# Patient Record
Sex: FEMALE | Race: OTHER
[De-identification: names, ages, dates, MRNs, and addresses within clinical notes are randomized per-mention and may not be internally consistent; named-entity substitution may affect disease eponyms.]

---

## 2017-01-18 ENCOUNTER — HOSPITAL ENCOUNTER (EMERGENCY)
Age: 42
Discharge: HOME | End: 2017-01-18
Payer: COMMERCIAL

## 2017-01-20 ENCOUNTER — HOSPITAL ENCOUNTER (OUTPATIENT)
Age: 42
Discharge: HOME | End: 2017-01-20
Payer: COMMERCIAL

## 2017-01-20 DIAGNOSIS — Q04.6: ICD-10-CM

## 2017-01-20 DIAGNOSIS — G93.5: Primary | ICD-10-CM

## 2017-01-20 PROCEDURE — 70553 MRI BRAIN STEM W/O & W/DYE: CPT

## 2017-02-19 ENCOUNTER — HOSPITAL ENCOUNTER (EMERGENCY)
Age: 42
Discharge: HOME | End: 2017-02-19
Payer: COMMERCIAL

## 2017-02-19 DIAGNOSIS — R10.9: Primary | ICD-10-CM

## 2017-02-19 DIAGNOSIS — M79.7: ICD-10-CM

## 2017-02-19 DIAGNOSIS — Z87.442: ICD-10-CM

## 2017-02-19 DIAGNOSIS — Z87.891: ICD-10-CM

## 2017-02-19 DIAGNOSIS — R11.2: ICD-10-CM

## 2017-02-19 PROCEDURE — 99284 EMERGENCY DEPT VISIT MOD MDM: CPT

## 2017-02-19 PROCEDURE — 96365 THER/PROPH/DIAG IV INF INIT: CPT

## 2017-02-19 PROCEDURE — 36415 COLL VENOUS BLD VENIPUNCTURE: CPT

## 2017-02-19 PROCEDURE — 83690 ASSAY OF LIPASE: CPT

## 2017-02-19 PROCEDURE — 96375 TX/PRO/DX INJ NEW DRUG ADDON: CPT

## 2017-02-19 PROCEDURE — 81003 URINALYSIS AUTO W/O SCOPE: CPT

## 2017-02-19 PROCEDURE — 99283 EMERGENCY DEPT VISIT LOW MDM: CPT

## 2017-02-19 PROCEDURE — 85025 COMPLETE CBC W/AUTO DIFF WBC: CPT

## 2017-02-19 PROCEDURE — 96366 THER/PROPH/DIAG IV INF ADDON: CPT

## 2017-02-19 PROCEDURE — 80053 COMPREHEN METABOLIC PANEL: CPT

## 2017-02-22 ENCOUNTER — HOSPITAL ENCOUNTER (EMERGENCY)
Age: 42
LOS: 1 days | Discharge: HOME | End: 2017-02-23
Payer: COMMERCIAL

## 2017-02-22 DIAGNOSIS — Z87.891: ICD-10-CM

## 2017-02-22 DIAGNOSIS — Z87.442: ICD-10-CM

## 2017-02-22 DIAGNOSIS — R07.9: Primary | ICD-10-CM

## 2017-02-22 DIAGNOSIS — R10.13: ICD-10-CM

## 2017-02-22 DIAGNOSIS — M79.7: ICD-10-CM

## 2017-02-23 ENCOUNTER — HOSPITAL ENCOUNTER (EMERGENCY)
Dept: HOSPITAL 21 - SED | Age: 42
Discharge: HOME | End: 2017-02-23
Payer: COMMERCIAL

## 2017-02-23 VITALS
SYSTOLIC BLOOD PRESSURE: 130 MMHG | RESPIRATION RATE: 16 BRPM | DIASTOLIC BLOOD PRESSURE: 88 MMHG | HEART RATE: 81 BPM | OXYGEN SATURATION: 100 %

## 2017-02-23 VITALS
OXYGEN SATURATION: 98 % | HEART RATE: 80 BPM | SYSTOLIC BLOOD PRESSURE: 128 MMHG | RESPIRATION RATE: 16 BRPM | DIASTOLIC BLOOD PRESSURE: 82 MMHG

## 2017-02-23 VITALS — HEIGHT: 66 IN | WEIGHT: 137.28 LBS | BODY MASS INDEX: 22.06 KG/M2

## 2017-02-23 DIAGNOSIS — K29.70: Primary | ICD-10-CM

## 2017-02-23 DIAGNOSIS — Z98.84: ICD-10-CM

## 2017-02-23 DIAGNOSIS — Z88.8: ICD-10-CM

## 2017-02-23 DIAGNOSIS — F17.200: ICD-10-CM

## 2017-02-23 DIAGNOSIS — R10.13: ICD-10-CM

## 2017-02-23 DIAGNOSIS — Z88.6: ICD-10-CM

## 2017-02-23 LAB
BASOPHILS % (AUTO): 0.1 % (ref 0–3)
EOSINOPHILS % (AUTO): 2.6 % (ref 0–5)
LIPASE SERPL-CCNC: 72 U/L (ref 13–60)
MAGNESIUM: 1.9 MG/DL (ref 1.6–2.6)
MCH RBC QN AUTO: 22.5 PG (ref 27–35)
MEAN CORPUSCULAR VOLUME: 73.3 FL (ref 81–100)
MONOCYTES % (AUTO): 7.4 % (ref 4–12)
NEUTROPHILS NFR BLD AUTO: 50.3 % (ref 40–74)
PLATELET COUNT: 370 BIL/L (ref 150–400)

## 2017-02-23 PROCEDURE — 80053 COMPREHEN METABOLIC PANEL: CPT

## 2017-02-23 PROCEDURE — 83735 ASSAY OF MAGNESIUM: CPT

## 2017-02-23 PROCEDURE — 96374 THER/PROPH/DIAG INJ IV PUSH: CPT

## 2017-02-23 PROCEDURE — 83690 ASSAY OF LIPASE: CPT

## 2017-02-23 PROCEDURE — 96361 HYDRATE IV INFUSION ADD-ON: CPT

## 2017-02-23 PROCEDURE — 74177 CT ABD & PELVIS W/CONTRAST: CPT

## 2017-02-23 PROCEDURE — 96375 TX/PRO/DX INJ NEW DRUG ADDON: CPT

## 2017-02-23 PROCEDURE — 99285 EMERGENCY DEPT VISIT HI MDM: CPT

## 2017-02-23 PROCEDURE — 96376 TX/PRO/DX INJ SAME DRUG ADON: CPT

## 2017-02-23 PROCEDURE — 36415 COLL VENOUS BLD VENIPUNCTURE: CPT

## 2017-02-23 PROCEDURE — 85025 COMPLETE CBC W/AUTO DIFF WBC: CPT

## 2017-02-23 RX ADMIN — HYDROMORPHONE HYDROCHLORIDE PRN MG: 1 INJECTION, SOLUTION INTRAMUSCULAR; INTRAVENOUS; SUBCUTANEOUS at 20:08

## 2017-02-23 RX ADMIN — HYDROMORPHONE HYDROCHLORIDE PRN MG: 1 INJECTION, SOLUTION INTRAMUSCULAR; INTRAVENOUS; SUBCUTANEOUS at 20:30

## 2017-02-23 RX ADMIN — HYDROMORPHONE HYDROCHLORIDE PRN MG: 1 INJECTION, SOLUTION INTRAMUSCULAR; INTRAVENOUS; SUBCUTANEOUS at 22:02

## 2017-02-23 NOTE — DRSVH
PROCEDURE:  CT ABDOMEN AND PELVIS WITH CONTRAST ([id number])

 

INDICATIONS:  epigastic pain, h/o gastric bypass

 

TECHNIQUE:  

After the administration of oral and intravenous contrast, 5 mm thick sections acquired from the diap
hragms to the symphysis.  5 mm thick coronal and sagittal reformats were performed.  For radiation do
se reduction, the following was used:  automated exposure control, adjustment of mA and/or kV accordi
ng to patient size.  

 

COMPARISON:  [facility], CT, ABDOMEN/PELVIS WITH CONTRAST, 4/28/2016, 17:17.

 

FINDINGS:  

Image quality:  Excellent.  

 

ABDOMEN:  

Lung bases:  Lung bases are clear.  Heart size is normal.  

 

Solid organs:  Liver and spleen are normal in size and enhancement. Scattered low attenuation foci ar
e present within the liver, suggestive of cysts. Low-attenuation splenic foci are again noted without
 change. Gallbladder has been removed.  Biliary system is non-dilated.  Pancreas enhances normally.  
No adrenal nodules.  Kidneys are normal in size and enhancement, without hydronephrosis.  

 

Peritoneum and bowel:  Small bowel, and colon loops are normal in caliber and wall thickness.  No lanette
e fluid or air.  Gastric bypass surgical changes are noted. Moderate stool is present.

 

Nodes and vessels:  No retroperitoneal or mesenteric adenopathy.  Aorta and inferior vena cava are no
rmal in caliber.  

 

Miscellaneous:  No ventral hernias.  

 

 

PELVIS:  

Genitourinary:  Bladder wall thickness is normal.  Prominent follicles/small cysts within the left ov
chuck.

 

Miscellaneous:  No inguinal hernias or adenopathy.  

 

Bones:  No suspicious bony lesions.  No vertebral body compression fractures.  

 

IMPRESSION:

 

1. Surgical changes reflecting gastric bypass. No acute abdominal or pelvic pathology.

 

2. Hepatic cysts.

 

 

 

Dictated by: Myla Celaya M.D. on 2/23/2017 at 22:06     

Approved by: Myla Celaya M.D. on 2/23/2017 at 22:10

## 2017-02-23 NOTE — ED.REPORT
HPI-Abd Pain F 40 and Over


Date of Service


2017


 ED Provider: Papa Ordonez MD


This is a 41 year old female with a history of pancreatitis s/p gastric bypass 

presenting to the emergency department complaining of epigastric abdominal pain 

that began 4 days ago. Reports radiation of epigastric pain to the back and 

vomiting. Maalox and Tums have not provided any relief. Reports decreased PO 

intake, pt states even swallowing saliva causes severe "clamping" pain. Denies 

recent NSAID use. Denies melena, hematochezia, hematemesis, dysuria, diarrhea, 

or constipation. Pt had revision gastric bypass in 2016.


Nursing Notes


Stated Complaint:  STOMACH PAIN


Chief Complaint:  Female Abdominal Pain


Nursing Notes Reviewed:  Yes


Allergies:  


Coded Allergies:  


     meloxicam (Verified  Allergy, Severe, Rash, 17)


 rash, swelling of hands


     NSAIDS (Non-Steroidal Anti-Inflamma (Verified  Allergy, Unknown, due to 

gastric bypass, 17)


     aspirin (Verified  Allergy, Unknown, hives, 17)


     sumatriptan succinate (Verified  Allergy, Unknown, 17)


 ANY TRITEN


Scheduled PRN


Ibuprofen (Ibuprofen) 200 Mg Capsule 400 MG PO QID PRN PRN For Pain 


Metoclopramide (Reglan) Unknown Strength Tablet Unknown Dose PO QID PRN PRN For 

Nausea 


Ondansetron (Zofran) 4 Mg Tablet 4 MG PO Q4 PRN PRN For Nausea 


Polyethylene Glycol 3350 (Miralax) 17 Gm Powd.pack 17 GM PO DAILY PRN PRN 

CONSTIPATION 


Promethazine (Promethazine) 25 Mg Tablet 25 MG PO Q6H PRN PRN For Nausea/

Vomiting 


   only use either the rectal or oral tablets.  Do not use more than 25mg total 

in 6 hrs. 


Promethazine HCl (Phenergan) 25 Mg Supp.rect 25 MG RC QID PRN PRN For Nausea/

Vomiting 


Zolpidem (Ambien) 5 Mg Tab 10 MG PO HS PRN PRN For Insomnia 


oxyCODONE-Acetaminophen 5-325 mg (oxyCODONE-Acetaminophen 5-325 mg) 1 Each 

Tablet 1-2 TAB PO Q6H PRN PRN For Pain 





General


Time Seen by MD:  18:52





Chief Complaint


Abdominal pain


Hx Obtained From:  Patient


Arrived By:  Walk-in


Sudden in Onset?:  Yes


Onset Occurred:  4 days ago


Symptom Duration:  Since onset


Severity: Current:  Mild


Pertinent Negative:  Pt denies other symptoms


Recent Healthcare:  No recent doctor visit, No recent hospitalization


Similar Sx Previous:  No





Past Medical History


Past Medical History Notes:  


CARLEE report is reviewed and notable for multiple narcotics and


benzodiazepines from multiple providers and multiple recent ER visits.


Past Medical History





Hernias


Pancreatitis





Past Surgical History





3 hernia repair surgeries


Gastric bypass surgery


Major intestinal surgery after intestines prolapsed into back in 2014 which was followed by multiple episodes of pancreatisis


Reports: , Cholecystectomy, Hysterectomy





Smoking History


Current Every Day Smoker





Social History


Alcohol Use:  Denies alcohol use


Drug Use:  Denies drug use


Other Social History:  





Ambulatory Status


Independent





Review of Systems


Constitutional:  Denies: Chills, Fever


GI:  Reports: Abdominal pain, Vomiting, 


   Denies: Constipation, Diarrhea, Hematemesis, Hematochezia, Nausea


 Female:  Denies: Dysuria


Complete sys rev & neg:  except as marked.





Physical Exam


Vital Signs





 Vital Signs (First)








  Date Time  Temp Pulse Resp B/P Pulse Ox O2 Delivery O2 Flow Rate FiO2


 


17 18:05 36.3 81 16 130/88 100 Room Air  








Initial VS:  Reviewed


    Head / Eyes:  Atraumatic, Normocephalic, PERRL


    ENT:  Mucous membranes moist, Conjunctiva normal, No scleral icterus


    Neck:  Supple, Non-tender, Full range of motion


    Extremities:  Vascular intact, Neuro intact, No swelling, No tenderness


    Skin:  Warm, Dry, No cyanosis


    Neurologic:  Alert, Oriented, Nonfocal


    Psychiatric:  Mood/affect normal, Behavior normal, Normal thought content


General/Constitutional:  Awake, Alert


Respiratory / Chest:  Breath sounds NL, Breath sounds = bilat, No respiratory 

distress, No rales, No rhonchi, No wheezing, No stridor


Cardiovascular:  Heart rate NL, Regular rhythm, Heart sounds NL, Peripheral 

circulation NL


Abdomen:  Soft, No guarding, No rebound, BS normoactive


Tenderness/Guarding/Rebound:  Positive: Tender epigastric


Back:  Inspection NL, Non-tender, No CVA tenderness





Interpretation & Diagnostics


Lab Results Interpretation


Result Diagram:  


17














Test


  17


18:17 17


19:00


 


White Blood Count


  7.5th/mm3


(3.8-10.1) 


 


 


Red Blood Count


  4.49mil/mm3


(3.90-5.20) 


 


 


Hemoglobin


  10.1g/dL


(12.0-15.6) 


 


 


Hematocrit


  32.9%


(35.0-46.0) 


 


 


Mean Corpuscular Volume


  73.3fL


() 


 


 


Mean Corpuscular Hemoglobin


  22.5pg


(27.0-35.0) 


 


 


Mean Corpuscular Hemoglobin


Concent 30.7%


(32.0-37.0) 


 


 


Red Cell Distribution Width


  19.9%


(12.3-15.4) 


 


 


Platelet Count


  370bil/L


(150-400) 


 


 


Neutrophils (%) (Auto) 50.3% (40-74)  


 


Lymphocytes (%) (Auto) 39.3% (14-46)  


 


Monocytes (%) (Auto) 7.4% (4-12)  


 


Eosinophils (%) (Auto) 2.6% (0-5)  


 


Basophils (%) (Auto) 0.1% (0-3)  


 


Sodium Level


  137mEq/L


(134-144) 


 


 


Potassium Level


  3.8mEq/L


(3.5-5.2) 


 


 


Chloride Level


  101mEq/L


() 


 


 


Carbon Dioxide Level


  25mmol/L


(18-29) 


 


 


Blood Urea Nitrogen 6mg/dL (6-24)  


 


Creatinine


  0.61mg/dL


(0.57-1.00) 


 


 


Estimat Glomerular Filtration


Rate 156mL/min


(>59) 


 


 


Glucose Level


  114mg/dL


(60-99) 


 


 


Calcium Level


  8.7mg/dL


(8.5-10.1) 


 


 


Magnesium Level


  1.9mg/dL


(1.6-2.6) 


 


 


Total Bilirubin


  0.4mg/dL


(0.0-1.2) 


 


 


Aspartate Amino Transf


(AST/SGOT) 20U/L (0-50) 


  


 


 


Alanine Aminotransferase


(ALT/SGPT) 12U/L (0-32) 


  


 


 


Alkaline Phosphatase 75U/L ()  


 


Total Protein


  7.0g/dL


(6.4-8.4) 


 


 


Albumin


  4.0g/dL


(3.4-5.0) 


 


 


Lipase 72U/L (13-60)  


 


Hold Grey Top Tube


  Received


(Received) 


 


 


Hold Urine


  


  Received


(Received)











CT Abd / Pelvis Interpretation


Study type:  Abdom CT oral contrast


Interpretation / Wet Read by:  Discussed w radiologist


NL CT Abdomen Findings:  No acute disease





Re-Eval/Medical Decision


Med Decision/Clinical Course


This is a 41 year old female with a history of pancreatitis s/p gastric bypass 

presenting to the emergency department complaining of epigastric abdominal pain 

that began 4 days ago. Reports radiation of epigastric pain to the back and 

vomiting. Maalox and Tums have not provided any relief. Reports decreased PO 

intake, pt states even swallowing saliva causes severe "clamping" pain. Denies 

recent NSAID use. Denies melena, hematochezia, hematemesis, dysuria, diarrhea, 

or constipation. Pt had revision gastric bypass in 2016.





Here in the emergency department the patient is afebrile stool examination as 

above.  Of note she is tender about the epigastric region.





Laboratory studies notable as below:


no leucocytosis


Hct 32.9 which is stable from baseline 


chemistry unremarkable


lipase mildly elevated at 72 though decreased from baseline





Patient was treated with the below medications:


IV pantoprazole 


IV fluids


8 mg Zofran 


hydromorphone PRN 





Patient reported significant symptomatic improvement.  Due to concern for 

possible anastomotic leak competition related to her gastric bypass surgery I 

obtained a CT scan of the abdomen and pelvis that was essentially unremarkable.

  Serial abdominal examinations remained benign.  Patient has been started on 

omeprazole and referred to gastroenterology for consideration of upper 

endoscopy to assess for any evidence of peptic ulcer disease.  At this time I 

feel she is appropriate for discharge home.  Pulse pressure precautions were 

given detail and she was discharged in good condition.


Counseled Regarding:  Diagnosis, Lab results, Need for follow-up, When/why to 

return to ED





Discharge & Departure





 Primary Impression:  


 Gastritis


 Additional Impressions:  


 History of gastric bypass


 Epigastric pain


Disposition:  Home


Discharge Condition


All VS Reviewed:  Yes


Condition:  Stable


Patient Instructions:  Gastritis (ED)





Additional Instructions:


Thank you for seeking care at emergency room.





It is difficult for us to make definitive diagnoses in the ED but we believe 

that you are experiencing gastritis. 





Our primary goal today in the ED was to evaluate you for any life-threatening 

conditions. Your evaluation was reassuring.





You will be discharged with a prescription for omeprazole, take as prescribed. 





You should follow-up with the GI doctor listed below. 





You should return to the ED immediately if you develop fevers, vomiting, cough, 

shortness of breath, chest pain, lightheadedness, weakness or any other 

concerning signs or symptoms.





Thank you for letting us partake in your care today.


Referrals:  


OTHER,PHYSICIAN (PCP)











Javier Negron MD


Scribe Attestation


Portions of this note were transcribed by Guanaco Lira. I, Dr. Ordonez 

personally performed the history, physical exam and medical decision-making; I 

reviewed and confirmed the accuracy of the information in the transcribed note. 





Signed by: santosh Canseco. 2017, 23:30.








Paap Ordonez MD 2017 18:55


GUANACO LIRA 2017 19:00

## 2017-05-06 ENCOUNTER — HOSPITAL ENCOUNTER (EMERGENCY)
Dept: HOSPITAL 76 - ED | Age: 42
Discharge: LEFT BEFORE DISCHARGE | End: 2017-05-06
Payer: COMMERCIAL

## 2017-05-06 VITALS — DIASTOLIC BLOOD PRESSURE: 89 MMHG | SYSTOLIC BLOOD PRESSURE: 125 MMHG

## 2017-05-06 DIAGNOSIS — Z53.21: Primary | ICD-10-CM

## 2017-05-21 ENCOUNTER — HOSPITAL ENCOUNTER (EMERGENCY)
Dept: HOSPITAL 21 - SED | Age: 42
Discharge: HOME | End: 2017-05-21
Payer: COMMERCIAL

## 2017-05-21 VITALS
DIASTOLIC BLOOD PRESSURE: 86 MMHG | HEART RATE: 93 BPM | SYSTOLIC BLOOD PRESSURE: 132 MMHG | RESPIRATION RATE: 20 BRPM | OXYGEN SATURATION: 100 %

## 2017-05-21 VITALS
SYSTOLIC BLOOD PRESSURE: 132 MMHG | DIASTOLIC BLOOD PRESSURE: 86 MMHG | RESPIRATION RATE: 20 BRPM | OXYGEN SATURATION: 100 % | HEART RATE: 93 BPM

## 2017-05-21 VITALS — HEIGHT: 66 IN | WEIGHT: 142.31 LBS | BODY MASS INDEX: 22.87 KG/M2

## 2017-05-21 DIAGNOSIS — Z98.890: ICD-10-CM

## 2017-05-21 DIAGNOSIS — N20.1: Primary | ICD-10-CM

## 2017-05-21 DIAGNOSIS — F17.200: ICD-10-CM

## 2017-05-21 DIAGNOSIS — Z88.6: ICD-10-CM

## 2017-05-21 DIAGNOSIS — Z88.9: ICD-10-CM

## 2017-05-21 DIAGNOSIS — R11.0: ICD-10-CM

## 2017-05-21 DIAGNOSIS — R10.9: ICD-10-CM

## 2017-05-21 LAB
APPEARANCE UR: CLEAR
BASOPHILS % (AUTO): 0.4 % (ref 0–3)
COLOR,URINE: (no result)
EOSINOPHILS % (AUTO): 2.2 % (ref 0–5)
LIPASE SERPL-CCNC: 106 U/L (ref 13–60)
MAGNESIUM: 2 MG/DL (ref 1.6–2.6)
MCH RBC QN AUTO: 21.8 PG (ref 27–35)
MEAN CORPUSCULAR VOLUME: 71.9 FL (ref 81–100)
MONOCYTES % (AUTO): 6.9 % (ref 4–12)
NEUTROPHILS NFR BLD AUTO: 52.3 % (ref 40–74)
PH,URINE: 5.5 (ref 5–8)
PLATELET COUNT: 405 BIL/L (ref 150–400)
RBC UR QL: (no result)
SPECIFIC GRAVITY,URINE: 1 (ref 1–1.03)
UROBILINOGEN UR-MCNC: NORMAL MG/DL
YEAST,URINE: (no result)

## 2017-05-21 PROCEDURE — 96374 THER/PROPH/DIAG INJ IV PUSH: CPT

## 2017-05-21 PROCEDURE — 81000 URINALYSIS NONAUTO W/SCOPE: CPT

## 2017-05-21 PROCEDURE — 83735 ASSAY OF MAGNESIUM: CPT

## 2017-05-21 PROCEDURE — 74176 CT ABD & PELVIS W/O CONTRAST: CPT

## 2017-05-21 PROCEDURE — 80053 COMPREHEN METABOLIC PANEL: CPT

## 2017-05-21 PROCEDURE — 83690 ASSAY OF LIPASE: CPT

## 2017-05-21 PROCEDURE — 85025 COMPLETE CBC W/AUTO DIFF WBC: CPT

## 2017-05-21 PROCEDURE — 96361 HYDRATE IV INFUSION ADD-ON: CPT

## 2017-05-21 PROCEDURE — 96375 TX/PRO/DX INJ NEW DRUG ADDON: CPT

## 2017-05-21 PROCEDURE — 36415 COLL VENOUS BLD VENIPUNCTURE: CPT

## 2017-05-21 PROCEDURE — 99285 EMERGENCY DEPT VISIT HI MDM: CPT

## 2017-05-21 NOTE — DRSVH
PROCEDURE:  CT KUB ([id number])

 

INDICATIONS:  Right flank h/o kidney stones

 

TECHNIQUE:  

Noncontrast 5 mm thick sections acquired from the diaphragms to the symphysis.  5 mm thick coronal an
d sagittal reformats were then performed.  For radiation dose reduction, the following was used:  aut
omated exposure control, adjustment of mA and/or kV according to patient size.  

 

COMPARISON:  [facility], , ABDOMEN 2 VIEW, 3/24/2017, 23:05.  [facility], CT, CT A
BD PELVIS W CON, 2/23/2017, 21:37.  [facility], CR, ABDOMEN ACUTE SERIES, 6/25/2016, 21:49.  [facility], CT, ABDOMEN/PELVIS WITH CONTRAST, 4/28/2016, 17:17.  [facility], , ABDOMEN 2 VIE
W, 4/27/2016, 8:54.  [facility], ABDOMEN 2 VIEW, 2/27/2016, 16:16.  [facility], US, ABD
OMEN LIMITED, 2/24/2016, 21:07.  [facility], , ABDOMEN 2 VIEW, 2/24/2016, 20:34.  [facility], CR, ABDOMEN ACUTE SERIES, 9/29/2015, 18:08.  [facility], CT, CT ABD PELVIS W CON, 8/
30/2015, 17:20.  [facility], CR, ABDOMEN ACUTE SERIES, 7/21/2015, 0:09.  [facility],
 CR, ABD ACUTE SERIES, 6/13/2015, 15:36.  [facility], CT, ABDOMEN/PELVIS WITH CONTRAST, 4/20/201
5, 8:32.  [facility], CR, ABDOMEN ACUTE SERIES, 2/16/2015, 23:02.  [facility] CR, ABDOMEN 
ACUTE SERIES, 2/02/2015, 19:27.  [facility], CT, ABDOMEN/PELVIS WITH CONTRAST, 9/07/2014, 19:16.
  [facility], CT, ABDOMEN/PELVIS WITHOUT CONTRAS, 9/04/2014, 9:10.  [facility], CT, ABDOMEN
/PELVIS WITH CONTRAST, 8/08/2014, 0:53.  [facility], CT, ABDOMEN/PELVIS WITH CONTRAST, 5/23/2013
, 20:47.  [facility], CT, KIDNEY/ URETER/BLADDER, 10/25/2012, 17:48.

 

FINDINGS:  

Image quality:  Excellent.  

 

Lung bases:  Lung bases are clear.  Heart size is normal.  

 

Urinary system:  Both kidneys are normal in size.  No kidney stones.  No hydronephrosis or perinephri
c fat stranding.  Both ureters appear non-dilated throughout their expected courses.  Bladder wall th
ickness is normal; no calcified bladder stones. Small phleboliths in the lower pelvis are stable comp
ared to prior examinations.  

 

Other solid organs:  Liver and spleen are normal in size. Attic cysts are stable compared to prior ex
aminations.  Gallbladder surgically absent.  Pancreas is normal in contours.  No adrenal nodules.  

 

Peritoneum and bowel:  Postsurgical changes compatible prior gastric bypass surgery noted.  Unenhance
d bowel loops demonstrate normal wall thickness and caliber.  No free fluid or air. Visualized append
ix is normal.  

 

Nodes and vessels:  No retroperitoneal or mesenteric adenopathy by size criteria.  Aorta and inferior
 vena cava are normal in caliber.  

 

Abdominal wall:  No ventral hernias.  

 

Pelvis:  No free pelvic fluid.  No inguinal hernias or adenopathy. Right adnexal region cyst are stab
le compared to prior CT scan obtained 2/23/2017.  

 

Bones:  No suspicious bony lesions.  No vertebral body compression fractures.  

 

 

IMPRESSION:

 

1.  No renal stone or hydronephrosis.

 

2.  Status post gastric bypass, cholecystectomy and hysterectomy.

 

3.  2.6 cm and 2.0 cm left adnexal region cyst.  Recommend nonemergent pelvic ultrasound for definiti
ve characterization.

 

 

 

Dictated by: Tamara Gerber MD, PhD on 5/21/2017 at 11:03     

Approved by: Tamara Gerber MD, PhD on 5/21/2017 at 11:09

## 2017-05-21 NOTE — ED.REPORT
HPI-Abd Pain F 40 and Over


Date of Service


May 21, 2017


 ED Provider: Rolando Abraham MD


The patient is a 41 year old female who presents to the ED with severe lower 

back pain onset last night. C/o associated nausea. There was no apparent fall 

or trauma that could have led to symptoms. She denies UTI symptoms and fever.  

When she stands up she has groin pain. Sig hx of hysterectomy, cholecystectomy, 

gastric bypass surgery, intestinal surgery, and pancreatitis. Pt requests 

narcotics.


Nursing Notes


Stated Complaint:  KIDNEY PAIN


Chief Complaint:  Female Abdominal Pain


Nursing Notes Reviewed:  Yes


Allergies:  


Coded Allergies:  


     meloxicam (Verified  Allergy, Severe, Rash, 17)


 rash, swelling of hands


     NSAIDS (Non-Steroidal Anti-Inflamma (Verified  Allergy, Unknown, due to 

gastric bypass, 17)


     aspirin (Verified  Allergy, Unknown, hives, 17)


     sumatriptan succinate (Verified  Allergy, Unknown, 17)


 ANY TRITEN


Scheduled


Omeprazole (Omeprazole) 20 Mg Capsule.dr 20 MG PO BID 





Scheduled PRN


Hydrocodone-Acetaminophen 5-325 mg (Hydrocodone-Acetaminophen 5-325 mg) 1 Each 

Tablet 1 TABLET PO Q4H PRN PRN For Pain 


Hydrocodone-Acetaminophen 5-325 mg (Hydrocodone-Acetaminophen 5-325 mg) 1 Each 

Tablet 1 TABLET PO Q4H PRN PRN For Pain 


Ibuprofen (Ibuprofen) 200 Mg Capsule 400 MG PO QID PRN PRN For Pain 


Metoclopramide (Reglan) Unknown Strength Tablet Unknown Dose PO QID PRN PRN For 

Nausea 


Ondansetron (Zofran) 4 Mg Tablet 4 MG PO Q4 PRN PRN For Nausea 


Ondansetron ODT (Zofran ODT) 4 Mg Tablet 4 MG PO Q4H PRN PRN For Nausea 


Polyethylene Glycol 3350 (Miralax) 17 Gm Powd.pack 17 GM PO DAILY PRN PRN 

CONSTIPATION 


Promethazine (Promethazine) 25 Mg Tablet 25 MG PO Q6H PRN PRN For Nausea/

Vomiting 


   only use either the rectal or oral tablets.  Do not use more than 25mg total 

in 6 hrs. 


Promethazine HCl (Phenergan) 25 Mg Supp.rect 25 MG RC QID PRN PRN For Nausea/

Vomiting 


Zolpidem (Ambien) 5 Mg Tab 10 MG PO HS PRN PRN For Insomnia 


oxyCODONE-Acetaminophen 5-325 mg (oxyCODONE-Acetaminophen 5-325 mg) 1 Each 

Tablet 1-2 TAB PO Q6H PRN PRN For Pain 





General


Time Seen by MD:  10:14





Chief Complaint


Other (lower back pain)


Hx Obtained From:  Patient


Arrived By:  Walk-in


Sudden in Onset?:  Yes


Onset Occurred:  Yesterday


Symptom Duration:  Since onset


Location:  : Back


Quality:  Painful


Severity: Current:  Moderate


Recent Healthcare:  No recent doctor visit, No recent hospitalization


Similar Sx Previous:  No





Past Medical History


Past Medical History Notes:  


CARLEE report is reviewed and notable for multiple narcotics and


benzodiazepines from multiple providers and multiple recent ER visits.


Past Medical History





Hernias


Pancreatitis





Past Surgical History





3 hernia repair surgeries


Gastric bypass surgery


Major intestinal surgery after intestines prolapsed into back in 2014 which was followed by multiple episodes of pancreatitis


Reports: , Cholecystectomy, Hysterectomy, Inguinal hernia repair





Smoking History


Current Every Day Smoker





Social History


Alcohol Use:  Denies alcohol use


Drug Use:  Denies drug use


Other Social History:  





Ambulatory Status


Independent





Review of Systems





epigastric pain


Constitutional:  Denies: Fever


GI:  Reports: Nausea


 Female:  Denies: Dysuria, Hematuria, Urinary frequency, Urinary urgency


Musculoskeletal:  Reports: Back pain


Complete sys rev & neg:  except as marked.





Physical Exam


Vital Signs





 Vital Signs (First)








  Date Time  Temp Pulse Resp B/P Pulse Ox O2 Delivery O2 Flow Rate FiO2


 


17 10:03 36.1 93 20 132/86 100 Room Air  








Initial VS:  Reviewed


    Head / Eyes:  Atraumatic, Normocephalic


    ENT:  Mucous membranes moist


    Lymphatic:  No lymphadenopathy


    Extremities:  Vascular intact, Neuro intact, No swelling, No tenderness


    Skin:  Warm, Dry


Abdomen:  Atraumatic, Soft, Non-tender


Back:  No muscle spasm





right cva tenderness





Interpretation & Diagnostics


Interpretation & Diagnostics:  


CT KUB IMPRESSION:


1.  No renal stone or hydronephrosis.


2.  Status post gastric bypass, cholecystectomy and hysterectomy.


3.  2.6 cm and 2.0 cm left adnexal region cyst.  Recommend nonemergent


pelvic ultrasound for definitive characterization.


Dictated by: Tamara Gerber MD, PhD on 2017 at 11:03     


Approved by: Tamara Gerber MD, PhD on 2017 at 11:09


Lab Results Interpretation


Result Diagram:  


17 1025                                                                   

             17 1025














Test


  17


10:20 17


10:25


 


Urine Color Straw (YELLOW)  


 


Urine Appearance


  Clear


(CLEAR,HAZY) 


 


 


Urine pH 5.5 (5.0-8.0)  


 


Urine Specific Gravity


  1.005


(1.003-1.035) 


 


 


Urine Protein


  Negativemg/dL


(NEG,TRACE) 


 


 


Urine Glucose (UA)


  Negativemg/dL


(NEGATIVE) 


 


 


Urine Ketones


  Negativemg/dL


(NEGATIVE) 


 


 


Urine Occult Blood


  Trace


(NEGATIVE) 


 


 


Urine Nitrite


  Negative


(NEGATIVE) 


 


 


Urine Bilirubin


  Negative


(NEGATIVE) 


 


 


Urine Urobilinogen


  Normalmg/dL


(NORMAL) 


 


 


Urine Leukocyte Esterase


  Negative


(NEGATIVE) 


 


 


Urine RBC 0-2/hpf (0-2)  


 


Urine WBC 0-5/hpf (0-5)  


 


Urine Epithelial Cells


  Few/hpf


(NONE-MOD) 


 


 


Urine Crystals


  None seen


(NONE SEEN) 


 


 


Urine Bacteria


  Few/hpf


(NONE-FEW) 


 


 


Urine Hyaline Casts


  None/lpf


(NONE) 


 


 


Urine Granular Casts


  None seen


(NONE SEEN) 


 


 


Urine Waxy Casts


  None seen


(NONE SEEN) 


 


 


Urine Red Blood Cell Casts


  None seen


(NONE SEEN) 


 


 


Urine White Blood Cell Casts


  None seen


(NONE SEEN) 


 


 


Urine Mucus


  None seen


(None Seen) 


 


 


Urine Trichomonas


  None seen


(NONE SEEN) 


 


 


Urine Yeast


  None (NONE


SEEN) 


 


 


Urinalysis Comment None  


 


Urine Culture Reflexed Not indicated  


 


Hold Urine


  Received


(Received) 


 


 


White Blood Count


  


  5.4th/mm3


(3.8-10.1)


 


Red Blood Count


  


  4.41mil/mm3


(3.90-5.20)


 


Hemoglobin


  


  9.6g/dL


(12.0-15.6)


 


Hematocrit


  


  31.7%


(35.0-46.0)


 


Mean Corpuscular Volume


  


  71.9fL


()


 


Mean Corpuscular Hemoglobin


  


  21.8pg


(27.0-35.0)


 


Mean Corpuscular Hemoglobin


Concent 


  30.3%


(32.0-37.0)


 


Red Cell Distribution Width


  


  20.2%


(12.3-15.4)


 


Platelet Count


  


  405bil/L


(150-400)


 


Neutrophils (%) (Auto)  52.3% (40-74) 


 


Lymphocytes (%) (Auto)  38.2% (14-46) 


 


Monocytes (%) (Auto)  6.9% (4-12) 


 


Eosinophils (%) (Auto)  2.2% (0-5) 


 


Basophils (%) (Auto)  0.4% (0-3) 


 


Sodium Level


  


  139mEq/L


(134-144)


 


Potassium Level


  


  4.3mEq/L


(3.5-5.2)


 


Chloride Level


  


  105mEq/L


()


 


Carbon Dioxide Level


  


  23mmol/L


(18-29)


 


Blood Urea Nitrogen  9mg/dL (6-24) 


 


Creatinine


  


  0.46mg/dL


(0.57-1.00)


 


Estimat Glomerular Filtration


Rate 


  216mL/min


(>59)


 


Glucose Level


  


  87mg/dL


(60-99)


 


Calcium Level


  


  8.7mg/dL


(8.5-10.1)


 


Magnesium Level


  


  2.0mg/dL


(1.6-2.6)


 


Total Bilirubin


  


  0.5mg/dL


(0.0-1.2)


 


Aspartate Amino Transf


(AST/SGOT) 


  29U/L (0-50) 


 


 


Alanine Aminotransferase


(ALT/SGPT) 


  13U/L (0-32) 


 


 


Alkaline Phosphatase  72U/L () 


 


Total Protein


  


  6.4g/dL


(6.4-8.4)


 


Albumin


  


  3.5g/dL


(3.4-5.0)


 


Lipase  106U/L (13-60) 











Re-Eval/Medical Decision


Med Decision/Clinical Course





41-year-old female history of hysterectomy, cholecystectomy, appendectomy,


kidney stone presenting with right flank pain since last night.  Hematuria


on urine no evidence of infection.  Right CVA tenderness on initial exam.


Her CT KUB shows no evidence of kidney stones.  Her pain resolved.  Labs


are stable.  Likely with kidney stone passed.  Discharged home with return


precautions.


Re-Evaluation/Progress :  


   Time of Eval:  11:45


   Patient Status:  Condition improved, Pain improved


   Re-Evaluation/Progress Note:  


Pt rechecked. Her pain is much improved. Informed pt of normal catscan


results, plan for discharge and treatment. F/U and RTER warnings given. Pt


understands and agrees with plan. All questions addressed.


Counseled Regarding:  Diagnosis, Lab results, Need for follow-up, When/why to 

return to ED





Discharge & Departure





 Primary Impression:  


 Kidney stone


 Additional Impression:  


 Abdominal pain


 Abdominal location:  unspecified location  Qualified Code:  R10.9 - 

Unspecified abdominal pain


Disposition:  Home


Discharge Condition


All VS Reviewed:  Yes


Condition:  Stable





Additional Instructions:


Thank you for entrusting us with your care today. Your Catscan was normal  but 

suggests that you could have passed a kidney stone. All of your labs are normal 

as well and there are no signs of infection.  Return to the Emergency 

Department for any new or worsening symptoms including nausea, fever, vomiting, 

abdominal pain, and diarrhea.  I hope you feel better soon, enjoy the beautiful 

weather!


Referrals:  


OTHER,PHYSICIAN (PCP)











[facility]


Scribe Attestation


Portion of this note were transcribed by Shae Watson. I, Dr. Abraham, 

personally performed the history, physical exam, and medical decision-making: I 

reviewed and confirmed the accuracy for the information in the transcribed note.


 


Signed by: santosh Carter, 17 1200


copies to:   [facility]








Rolando Abraham MD May 21, 2017 10:23


Shae Watson May 21, 2017 10:44

## 2017-06-03 ENCOUNTER — HOSPITAL ENCOUNTER (EMERGENCY)
Dept: HOSPITAL 76 - ED | Age: 42
Discharge: HOME | End: 2017-06-03
Payer: COMMERCIAL

## 2017-06-03 VITALS — SYSTOLIC BLOOD PRESSURE: 118 MMHG | DIASTOLIC BLOOD PRESSURE: 69 MMHG

## 2017-06-03 DIAGNOSIS — Z87.442: ICD-10-CM

## 2017-06-03 DIAGNOSIS — Z87.891: ICD-10-CM

## 2017-06-03 DIAGNOSIS — R10.12: Primary | ICD-10-CM

## 2017-06-03 DIAGNOSIS — M79.7: ICD-10-CM

## 2017-06-03 DIAGNOSIS — K44.9: ICD-10-CM

## 2017-06-03 DIAGNOSIS — Z98.84: ICD-10-CM

## 2017-06-03 LAB
ALBUMIN/GLOB SERPL: 1.2 {RATIO} (ref 1–2.2)
ANION GAP SERPL CALCULATED.4IONS-SCNC: 8 MMOL/L (ref 6–13)
BILIRUB BLD-MCNC: 0.4 MG/DL (ref 0.2–1)
BUN SERPL-MCNC: 8 MG/DL (ref 6–20)
CALCIUM UR-MCNC: 8.5 MG/DL (ref 8.5–10.3)
CHLORIDE SERPL-SCNC: 103 MMOL/L (ref 101–111)
CO2 SERPL-SCNC: 26 MMOL/L (ref 21–32)
CREAT SERPLBLD-SCNC: 0.5 MG/DL (ref 0.4–1)
GFRSERPLBLD MDRD-ARVRAT: 137 ML/MIN/{1.73_M2} (ref 89–?)
GLOBULIN SER-MCNC: 3 G/DL (ref 2.1–4.2)
GLUCOSE SERPL-MCNC: 93 MG/DL (ref 70–100)
LIPASE SERPL-CCNC: 51 U/L (ref 22–51)
POTASSIUM SERPL-SCNC: 4.1 MMOL/L (ref 3.5–5)
PROT SPEC-MCNC: 6.7 G/DL (ref 6.7–8.2)
SODIUM SERPLBLD-SCNC: 137 MMOL/L (ref 135–145)

## 2017-06-03 PROCEDURE — 80053 COMPREHEN METABOLIC PANEL: CPT

## 2017-06-03 PROCEDURE — 99284 EMERGENCY DEPT VISIT MOD MDM: CPT

## 2017-06-03 PROCEDURE — 36415 COLL VENOUS BLD VENIPUNCTURE: CPT

## 2017-06-03 PROCEDURE — 83690 ASSAY OF LIPASE: CPT

## 2017-06-03 PROCEDURE — 99283 EMERGENCY DEPT VISIT LOW MDM: CPT

## 2017-07-04 ENCOUNTER — HOSPITAL ENCOUNTER (EMERGENCY)
Dept: HOSPITAL 21 - SED | Age: 42
Discharge: HOME | End: 2017-07-04
Payer: COMMERCIAL

## 2017-07-04 VITALS — HEIGHT: 66 IN | WEIGHT: 137.13 LBS | BODY MASS INDEX: 22.04 KG/M2

## 2017-07-04 VITALS
OXYGEN SATURATION: 100 % | SYSTOLIC BLOOD PRESSURE: 131 MMHG | RESPIRATION RATE: 17 BRPM | DIASTOLIC BLOOD PRESSURE: 78 MMHG

## 2017-07-04 DIAGNOSIS — Z98.818: ICD-10-CM

## 2017-07-04 DIAGNOSIS — Z88.6: ICD-10-CM

## 2017-07-04 DIAGNOSIS — F17.200: ICD-10-CM

## 2017-07-04 DIAGNOSIS — K08.89: Primary | ICD-10-CM

## 2017-07-04 DIAGNOSIS — M27.3: ICD-10-CM

## 2017-07-04 DIAGNOSIS — Z88.8: ICD-10-CM

## 2017-07-04 DIAGNOSIS — Z98.84: ICD-10-CM

## 2017-07-04 NOTE — ED.REPORT
HPI-Dental/Mouth Prob


Date of Service


2017


 ED Provider:  Dr. Ordonez


Pt is a 40 y/o female presenting to the ED s/p total extraction 6 days ago 

complaining of increased pain. She reports that the pain is increased today and 

that she can taste a discharge from the wound site. She denies any other 

symptoms at this time.  She denies fevers chills, nausea vomiting.


Nursing Notes


Stated Complaint:  LEFT LOWER JAW/EAR PAIN


Chief Complaint:  ENT & Mouth


Nursing Notes Reviewed:  Yes


Allergies:  


Coded Allergies:  


     meloxicam (Verified  Allergy, Severe, Rash, 17)


 rash, swelling of hands


     NSAIDS (Non-Steroidal Anti-Inflamma (Verified  Allergy, Unknown, due to 

gastric bypass, 17)


     aspirin (Verified  Allergy, Unknown, hives, 17)


     sumatriptan succinate (Verified  Allergy, Unknown, 17)


 ANY TRITEN


Scheduled


Omeprazole (Omeprazole) 20 Mg Capsule.dr 20 MG PO BID 





Scheduled PRN


Hydrocodone-Acetaminophen 5-325 mg (Hydrocodone-Acetaminophen 5-325 mg) 1 Each 

Tablet 1 TABLET PO Q4H PRN PRN For Pain 


Hydrocodone-Acetaminophen 5-325 mg (Hydrocodone-Acetaminophen 5-325 mg) 1 Each 

Tablet 1 TABLET PO Q4H PRN PRN For Pain 


Ibuprofen (Ibuprofen) 200 Mg Capsule 400 MG PO QID PRN PRN For Pain 


Metoclopramide (Reglan) Unknown Strength Tablet Unknown Dose PO QID PRN PRN For 

Nausea 


Ondansetron (Zofran) 4 Mg Tablet 4 MG PO Q4 PRN PRN For Nausea 


Ondansetron ODT (Zofran ODT) 4 Mg Tablet 4 MG PO Q4H PRN PRN For Nausea 


Polyethylene Glycol 3350 (Miralax) 17 Gm Powd.pack 17 GM PO DAILY PRN PRN 

CONSTIPATION 


Promethazine (Promethazine) 25 Mg Tablet 25 MG PO Q6H PRN PRN For Nausea/

Vomiting 


   only use either the rectal or oral tablets.  Do not use more than 25mg total 

in 6 hrs. 


Promethazine HCl (Phenergan) 25 Mg Supp.rect 25 MG RC QID PRN PRN For Nausea/

Vomiting 


Zolpidem (Ambien) 5 Mg Tab 10 MG PO HS PRN PRN For Insomnia 


oxyCODONE-Acetaminophen 5-325 mg (oxyCODONE-Acetaminophen 5-325 mg) 1 Each 

Tablet 1-2 TAB PO Q6H PRN PRN For Pain 


oxyCODONE-Acetaminophen 5-325 mg (oxyCODONE-Acetaminophen 5-325 mg) 1 Each 

Tablet 1 TAB PO Q4H PRN PRN For Pain 





General


Time Seen by MD:  17:05





Chief Complaint


Mouth pain


Hx Obtained From:  Patient


Arrived By:  Walk-in


Onset Occurred:  Just prior to arrival


Symptom Duration:  Since onset


Quality:  Painful


Severity: Current:  Moderate


Severity: Maximum:  Severe


Recent Healthcare:  No recent doctor visit, No recent hospitalization


Similar Sx Previous:  No





Past Medical History


Past Medical History Notes:  


CARLEE report is reviewed and notable for multiple narcotics and


benzodiazepines from multiple providers and multiple recent ER visits.


Past Medical History





Hernias


Pancreatitis





Past Surgical History





3 hernia repair surgeries


Gastric bypass surgery


Major intestinal surgery after intestines prolapsed into back in 2014 which was followed by multiple episodes of pancreatitis


Reports: , Cholecystectomy, Hysterectomy, Inguinal hernia repair





Smoking History


Current Every Day Smoker





Social History


Alcohol Use:  Denies alcohol use


Drug Use:  Denies drug use


Other Social History:  





Ambulatory Status


Independent





Review of Systems


Constitutional:  Denies: Fever, Weakness - generalized


Ears / Nose / Throat:  Reports: Mouth pain


GI:  Denies: Abdominal pain, Vomiting


Complete sys rev & neg:  except as marked.





Physical Exam


Initial Vital Signs





 Vital Signs (First)








  Date Time  Temp Pulse Resp B/P Pulse Ox O2 Delivery O2 Flow Rate FiO2


 


17 17:00 36.8 103 17 131/78 100 Room Air  








Initial VS:  Reviewed


General/Constitutional:  Well-developed, Well-nourished


    Head / Eyes:  Atraumatic, Normocephalic, PERRL


    Respiratory:  Breath sounds normal, Clear to auscultation, No respiratory 

distress


    Abdomen / GI:  Soft, Non-tender, No guarding, No rebound, No distention


    Extremities:  Vascular intact, Neuro intact, No swelling, No tenderness


    Skin:  Warm, Dry, No cyanosis


    Neurologic:  Alert, Oriented, Nonfocal


    Psychiatric:  Mood/affect normal, Behavior normal, Normal thought content


ENT:  Atraumatic, Airway patent, Mucous membranes moist





Tooth 17 absent. No bleeding or purulent discharge. No sign of inefection.


Neck:  Atraumatic, Supple, Full range of motion, No adenopathy





Re-Eval/Medical Decision


Med Decision/Clinical Course


Pt is a 40 y/o female presenting to the ED s/p total extraction 6 days ago 

complaining of increased pain. She reports that the pain is increased today and 

that she can taste a discharge from the wound site. She denies any other 

symptoms at this time.  Here in the emergency department the patient was 

afebrile with stable vital signs examination as above.  Overall presentation is 

most consistent with dry socket.  The socket was packed.  The patient was 

provided with pain medications.  She is artery on antibiotics which she is 

advised to continue taking. Considered other possible causes of dental pain 

including periapical abscess, gingivitis, Steve's angina, sinusitis, and molar 

impaction but these are unlikely based on the history and exam.  Patient will 

follow up with her dentist tomorrow. Also reviewed precautions for return to 

the ED and the importance of f/u with a dentist, which she agreed to do.


Re-Evaluation/Progress :  


   Time of Eval:  17:17


   Patient Status:  Condition improved


   Re-Evaluation/Progress Note:  


Discussed plan for discharge. Pt understands and agrees.


Counseled Regarding:  Diagnosis, Lab results, Need for follow-up, When/why to 

return to ED





Discharge & Departure





 Primary Impression:  


 Pain, dental


 Additional Impression:  


 Dry tooth socket


Disposition:  Home


Discharge Condition


All VS Reviewed:  Yes


Condition:  Improved





Additional Instructions:


Thank you for seeking care at the emergency room.


Our primary goal today in the ED was to evaluate you for any life-threatening 

conditions. Your evaluation was reassuring.


You will be discharged with a prescription for Oxycodone.


Keep taking the Amoxicillin. Follow up with your dentist as soon as they return 

to the office. Return to the ER or call a different dentist office if you 

develop any new or worsening symptoms. 


You should return to the ED immediately if you develop fevers, vomiting, cough, 

shortness of breath, chest pain, lightheadedness, weakness or any other 

concerning signs or symptoms.


Thank you for letting us partake in your care today.


Referrals:  


KARLENE BAILEY (PCP)


Scribe Attestation


Portions of this note were transcribed by Marie Mcelroy. I, Dr. Ordonez 

personally performed the history, physical exam and medical decision-making; I 

reviewed and confirmed the accuracy of the information in the transcribed note. 

Signed by: Arya Cates, 2017 at 1724.


copies to:   [facility] Papa Gaytan MD 2017 17:06


MARIE MCELROY 2017 17:17

## 2017-08-05 ENCOUNTER — HOSPITAL ENCOUNTER (EMERGENCY)
Dept: HOSPITAL 76 - ED | Age: 42
Discharge: HOME | End: 2017-08-05
Payer: COMMERCIAL

## 2017-08-05 VITALS — SYSTOLIC BLOOD PRESSURE: 123 MMHG | DIASTOLIC BLOOD PRESSURE: 82 MMHG

## 2017-08-05 DIAGNOSIS — R03.0: ICD-10-CM

## 2017-08-05 DIAGNOSIS — S30.0XXA: Primary | ICD-10-CM

## 2017-08-05 DIAGNOSIS — Z87.891: ICD-10-CM

## 2017-08-05 DIAGNOSIS — M79.7: ICD-10-CM
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
## 2017-08-05 PROCEDURE — 99283 EMERGENCY DEPT VISIT LOW MDM: CPT

## 2017-08-05 PROCEDURE — 72100 X-RAY EXAM L-S SPINE 2/3 VWS: CPT

## 2017-08-05 PROCEDURE — 72220 X-RAY EXAM SACRUM TAILBONE: CPT

## 2017-08-05 PROCEDURE — 99282 EMERGENCY DEPT VISIT SF MDM: CPT

## 2017-08-07 ENCOUNTER — HOSPITAL ENCOUNTER (EMERGENCY)
Dept: HOSPITAL 21 - SED | Age: 42
LOS: 1 days | Discharge: HOME | End: 2017-08-08
Payer: COMMERCIAL

## 2017-08-07 VITALS
DIASTOLIC BLOOD PRESSURE: 86 MMHG | RESPIRATION RATE: 17 BRPM | SYSTOLIC BLOOD PRESSURE: 120 MMHG | HEART RATE: 67 BPM | OXYGEN SATURATION: 94 %

## 2017-08-07 VITALS — BODY MASS INDEX: 22.06 KG/M2 | HEIGHT: 66 IN | WEIGHT: 137.28 LBS

## 2017-08-07 VITALS
DIASTOLIC BLOOD PRESSURE: 88 MMHG | OXYGEN SATURATION: 100 % | SYSTOLIC BLOOD PRESSURE: 139 MMHG | HEART RATE: 74 BPM | RESPIRATION RATE: 15 BRPM

## 2017-08-07 DIAGNOSIS — Z87.19: ICD-10-CM

## 2017-08-07 DIAGNOSIS — R10.13: ICD-10-CM

## 2017-08-07 DIAGNOSIS — F17.200: ICD-10-CM

## 2017-08-07 DIAGNOSIS — R11.2: Primary | ICD-10-CM

## 2017-08-07 DIAGNOSIS — Z88.8: ICD-10-CM

## 2017-08-07 DIAGNOSIS — Z88.6: ICD-10-CM

## 2017-08-07 DIAGNOSIS — Z98.890: ICD-10-CM

## 2017-08-07 DIAGNOSIS — M79.7: ICD-10-CM

## 2017-08-07 DIAGNOSIS — E78.5: ICD-10-CM

## 2017-08-07 DIAGNOSIS — K85.90: ICD-10-CM

## 2017-08-07 DIAGNOSIS — Z98.84: ICD-10-CM

## 2017-08-07 LAB
BASOPHILS % (AUTO): 0.3 % (ref 0–3)
EOSINOPHILS % (AUTO): 1.4 % (ref 0–5)
INR: 0.92 RATIO
LIPASE SERPL-CCNC: 64 U/L (ref 13–60)
MAGNESIUM: 2 MG/DL (ref 1.6–2.6)
MCH RBC QN AUTO: 20.8 PG (ref 27–35)
MEAN CORPUSCULAR VOLUME: 69.6 FL (ref 81–100)
MONOCYTES % (AUTO): 6.9 % (ref 4–12)
NEUTROPHILS NFR BLD AUTO: 49.7 % (ref 40–74)
PLATELET COUNT: 345 BIL/L (ref 150–400)

## 2017-08-07 PROCEDURE — 96374 THER/PROPH/DIAG INJ IV PUSH: CPT

## 2017-08-07 PROCEDURE — 83735 ASSAY OF MAGNESIUM: CPT

## 2017-08-07 PROCEDURE — 96375 TX/PRO/DX INJ NEW DRUG ADDON: CPT

## 2017-08-07 PROCEDURE — 83605 ASSAY OF LACTIC ACID: CPT

## 2017-08-07 PROCEDURE — 85025 COMPLETE CBC W/AUTO DIFF WBC: CPT

## 2017-08-07 PROCEDURE — 83690 ASSAY OF LIPASE: CPT

## 2017-08-07 PROCEDURE — 36415 COLL VENOUS BLD VENIPUNCTURE: CPT

## 2017-08-07 PROCEDURE — 80053 COMPREHEN METABOLIC PANEL: CPT

## 2017-08-07 PROCEDURE — 81000 URINALYSIS NONAUTO W/SCOPE: CPT

## 2017-08-07 PROCEDURE — 74177 CT ABD & PELVIS W/CONTRAST: CPT

## 2017-08-07 PROCEDURE — 85610 PROTHROMBIN TIME: CPT

## 2017-08-07 PROCEDURE — 99285 EMERGENCY DEPT VISIT HI MDM: CPT

## 2017-08-07 PROCEDURE — 96361 HYDRATE IV INFUSION ADD-ON: CPT

## 2017-08-08 VITALS
DIASTOLIC BLOOD PRESSURE: 67 MMHG | RESPIRATION RATE: 19 BRPM | OXYGEN SATURATION: 99 % | HEART RATE: 61 BPM | SYSTOLIC BLOOD PRESSURE: 106 MMHG

## 2017-08-08 LAB
APPEARANCE UR: CLEAR
COLOR,URINE: YELLOW
PH,URINE: 6.5 (ref 5–8)
RBC UR QL: (no result)
SPECIFIC GRAVITY,URINE: 1.01 (ref 1–1.03)
UROBILINOGEN UR-MCNC: NORMAL MG/DL
YEAST,URINE: (no result)

## 2017-08-08 NOTE — ED.REPORT
HPI-Abd Pain F 40 and Over


Date of Service


Aug 8, 2017


 ED Provider: Bull Vasquez MD


The pt is a 41 y/o female with a hx of pancreatitis (s/p gastric bypass), 

fibromyalgia, macrolipasemia, abdominal adhesions and multiple abdominal 

surgeries (with the last surgery last year) who presents to the ED complaining 

of epigastric pain since this morning. Associated sx include nausea, vomiting 

and "chalky" stool. The pt has been taking Zofran but it has not improved her 

sx. She denies fever, melena, hematochezia, and being in contact with someone 

who is sick. The pt had a revision gastric bypass in summer 2016 after a 

prolonged illness led to the discovery of an anastomotic leak.


Nursing Notes


Stated Complaint:  UPPER RIGHT ABDOMINAL/BACK PAIN, VOMITING


Chief Complaint:  Female Abdominal Pain


Nursing Notes Reviewed:  Yes


Allergies:  


Coded Allergies:  


     meloxicam (Verified  Allergy, Severe, Rash, 17)


 rash, swelling of hands


     NSAIDS (Non-Steroidal Anti-Inflamma (Verified  Allergy, Unknown, due to 

gastric bypass, 17)


     aspirin (Verified  Allergy, Unknown, hives, 17)


     sumatriptan succinate (Verified  Allergy, Unknown, 17)


 ANY TRITEN


Scheduled


Esomeprazole Magnesium (Nexium) 40 Mg Capsule.dr 40 MG PO DAILY 


Omeprazole (Omeprazole) 20 Mg Capsule.dr 20 MG PO BID 





Scheduled PRN


Hydrocodone-Acetaminophen 5-325 mg (Hydrocodone-Acetaminophen 5-325 mg) 1 Each 

Tablet 1 TABLET PO Q4H PRN PRN For Pain 


Hydrocodone-Acetaminophen 5-325 mg (Hydrocodone-Acetaminophen 5-325 mg) 1 Each 

Tablet 1 TABLET PO Q4H PRN PRN For Pain 


Ibuprofen (Ibuprofen) 200 Mg Capsule 400 MG PO QID PRN PRN For Pain 


Metoclopramide (Reglan) Unknown Strength Tablet Unknown Dose PO QID PRN PRN For 

Nausea 


Ondansetron (Zofran) 4 Mg Tablet 4 MG PO Q4 PRN PRN For Nausea 


Ondansetron ODT (Zofran ODT) 4 Mg Tablet 4 MG PO Q4H PRN PRN For Nausea 


Ondansetron ODT (Ondansetron ODT) 8 Mg Tab.rapdis 8 MG PO QID PRN PRN For 

Nausea 


Polyethylene Glycol 3350 (Miralax) 17 Gm Powd.pack 17 GM PO DAILY PRN PRN 

CONSTIPATION 


Promethazine (Promethazine) 25 Mg Tablet 25 MG PO Q6H PRN PRN For Nausea/

Vomiting 


   only use either the rectal or oral tablets.  Do not use more than 25mg total 

in 6 hrs. 


Promethazine HCl (Phenergan) 25 Mg Supp.rect 25 MG RC QID PRN PRN For Nausea/

Vomiting 


Zolpidem (Ambien) 5 Mg Tab 10 MG PO HS PRN PRN For Insomnia 


oxyCODONE-Acetaminophen 5-325 mg (oxyCODONE-Acetaminophen 5-325 mg) 1 Each 

Tablet 1-2 TAB PO Q6H PRN PRN For Pain 


oxyCODONE-Acetaminophen 5-325 mg (oxyCODONE-Acetaminophen 5-325 mg) 1 Each 

Tablet 1 TAB PO Q4H PRN PRN For Pain 





General


Time Seen by MD:  22:18





Chief Complaint


Abdominal pain


Hx Obtained From:  Patient


Arrived By:  Walk-in


Sudden in Onset?:  Yes


Onset Occurred:  5 - 8 hours ago


Symptom Duration:  Since onset


Location:  : Epigastric


Quality:  Painful


Severity: Current:  Moderate


Severity: Maximum:  Moderate


Recent Healthcare:  Recent doctor visit


Similar Sx Previous:  Yes





Past Medical History


Past Medical History Notes:  


CARLEE report is reviewed and notable for multiple narcotics and


benzodiazepines from multiple providers and multiple recent ER visits.


Past Medical History





Hernias


Pancreatitis


fibromyalgia


macrolipademia 


migraines


abdominal adhesions





Past Surgical History





3 hernia repair surgeries


Gastric bypass surgery


Major intestinal surgery after intestines prolapsed into back in 2014 which was followed by multiple episodes of pancreatitis


Reports: , Cholecystectomy, Hysterectomy, Inguinal hernia repair





Smoking History


Current Every Day Smoker





Social History


Alcohol Use:  Denies alcohol use


Drug Use:  Denies drug use


Other Social History:  





Ambulatory Status


Independent





Review of Systems


Reports: "chalky" stool


Constitutional:  Denies: Fever


GI:  Reports: Abdominal pain, Nausea, Vomiting, 


   Denies: Hematochezia, Melena


Complete sys rev & neg:  except as marked.





Physical Exam


Vital Signs





 Vital Signs (First)








  Date Time  Temp Pulse Resp B/P Pulse Ox O2 Delivery O2 Flow Rate FiO2


 


17 18:37 36.8 74 15 139/88 100 Room Air  








Initial VS:  Reviewed


    Head / Eyes:  Atraumatic, Normocephalic


    Neck:  Supple, Non-tender, Full range of motion


    Extremities:  Vascular intact, Neuro intact, No swelling, No tenderness


    Skin:  Warm, Dry, No cyanosis


    Neurologic:  Alert, Oriented, Nonfocal


General/Constitutional:  Awake, Alert, No acute distress, Cooperative


Respiratory / Chest:  Atraumatic, Breath sounds NL, Breath sounds = bilat, No 

respiratory distress, No rales, No rhonchi, No wheezing, No retractions


Cardiovascular:  Heart rate NL, Regular rhythm, Heart sounds NL, No gallop, No 

murmurs, No rubs


Abdomen:  Atraumatic, Soft


Tenderness/Guarding/Rebound:  Positive: Tender epigastric (mild)


Back:  Atraumatic, Full range of motion


ENT:  Atraumatic, Airway patent, Pharynx NL


Mouth:  Positive: Mucous membranes dry





Interpretation & Diagnostics


Lab Results Interpretation


Result Diagram:  


17














Test


  17


19:33 17


23:56 17


00:35


 


White Blood Count


  6.4th/mm3


(3.8-10.1) 


  


 


 


Red Blood Count


  4.51mil/mm3


(3.90-5.20) 


  


 


 


Hemoglobin


  9.4g/dL


(12.0-15.6) 


  


 


 


Hematocrit


  31.4%


(35.0-46.0) 


  


 


 


Mean Corpuscular Volume


  69.6fL


() 


  


 


 


Mean Corpuscular Hemoglobin


  20.8pg


(27.0-35.0) 


  


 


 


Mean Corpuscular Hemoglobin


Concent 29.9%


(32.0-37.0) 


  


 


 


Red Cell Distribution Width


  22.2%


(12.3-15.4) 


  


 


 


Platelet Count


  345bil/L


(150-400) 


  


 


 


Neutrophils (%) (Auto) 49.7% (40-74)   


 


Lymphocytes (%) (Auto) 41.5% (14-46)   


 


Monocytes (%) (Auto) 6.9% (4-12)   


 


Eosinophils (%) (Auto) 1.4% (0-5)   


 


Basophils (%) (Auto) 0.3% (0-3)   


 


Prothrombin Time


  9.8sec


(8.1-12.5) 


  


 


 


Prothromb Time International


Ratio 0.92ratio 


  


  


 


 


Sodium Level


  141mEq/L


(134-144) 


  


 


 


Potassium Level


  4.4mEq/L


(3.5-5.2) 


  


 


 


Chloride Level


  103mEq/L


() 


  


 


 


Carbon Dioxide Level


  22mmol/L


(18-29) 


  


 


 


Blood Urea Nitrogen 7mg/dL (6-24)   


 


Creatinine


  0.54mg/dL


(0.57-1.00) 


  


 


 


Estimat Glomerular Filtration


Rate 178mL/min


(>59) 


  


 


 


Glucose Level


  91mg/dL


(60-99) 


  


 


 


Calcium Level


  9.1mg/dL


(8.5-10.1) 


  


 


 


Total Bilirubin


  0.7mg/dL


(0.0-1.2) 


  


 


 


Aspartate Amino Transf


(AST/SGOT) 27U/L (0-50) 


  


  


 


 


Alanine Aminotransferase


(ALT/SGPT) 12U/L (0-32) 


  


  


 


 


Alkaline Phosphatase 80U/L ()   


 


Total Protein


  7.2g/dL


(6.4-8.4) 


  


 


 


Albumin


  4.2g/dL


(3.4-5.0) 


  


 


 


Lipase 64U/L (13-60)   


 


Lactic Acid Level


  


  1.1mmol/L


(0.4-2.0) 


 


 


Magnesium Level


  


  1.9mg/dL


(1.6-2.6) 


 


 


Urine Color


  


  


  Yellow


(YELLOW)


 


Urine Appearance


  


  


  Clear


(CLEAR,HAZY)


 


Urine pH   6.5 (5.0-8.0) 


 


Urine Specific Gravity


  


  


  1.015


(1.003-1.035)


 


Urine Protein


  


  


  Negativemg/dL


(NEG,TRACE)


 


Urine Glucose (UA)


  


  


  Negativemg/dL


(NEGATIVE)


 


Urine Ketones


  


  


  Negativemg/dL


(NEGATIVE)


 


Urine Occult Blood


  


  


  Trace


(NEGATIVE)


 


Urine Nitrite


  


  


  Negative


(NEGATIVE)


 


Urine Bilirubin


  


  


  Negative


(NEGATIVE)


 


Urine Urobilinogen


  


  


  Normalmg/dL


(NORMAL)


 


Urine Leukocyte Esterase


  


  


  Negative


(NEGATIVE)


 


Urine RBC   0-2/hpf (0-2) 


 


Urine WBC   0-5/hpf (0-5) 


 


Urine Epithelial Cells


  


  


  Occasional/hpf


(NONE-MOD)


 


Urine Crystals


  


  


  None seen


(NONE SEEN)


 


Urine Bacteria


  


  


  None/hpf


(NONE-FEW)


 


Urine Hyaline Casts


  


  


  None/lpf


(NONE)


 


Urine Granular Casts


  


  


  None seen


(NONE SEEN)


 


Urine Waxy Casts


  


  


  None seen


(NONE SEEN)


 


Urine Red Blood Cell Casts


  


  


  None seen


(NONE SEEN)


 


Urine White Blood Cell Casts


  


  


  None seen


(NONE SEEN)


 


Urine Mucus


  


  


  None seen


(None Seen)


 


Urine Trichomonas


  


  


  None seen


(NONE SEEN)


 


Urine Yeast


  


  


  None (NONE


SEEN)


 


Urinalysis Comment   None 


 


Urine Culture Reflexed   Not indicated 











CT Abd / Pelvis Interpretation





Post surgical changes. No small bowel obstruction. 


2.4cm and 2.2cm cyst sin the left ovary. 


Signed by Dr. Rc Rodríguez 17 00:33


Study type:  Abdominal CT IV contrast


Interpretation / Wet Read by:  Interpret - Radiologist





Re-Eval/Medical Decision


Med Decision/Clinical Course





42-year-old female status post gastric bypass and revision, presents with


abdominal pain with no significant findings on CT and evaluation.  Not


currently on antacids.  Esomeprazole started.  Zofran when necessary.


Source of Hx:  Old records


Re-Evaluation/Progress :  


   Time of Eval:  01:15


   Re-Evaluation/Progress Note:  


Rechecked pt. Discussed lab results, imaging results, diagnosis and plan


to discharge. Pt understands and agrees with the plan. F/U instruction and


RTER warning given. All questions addressed.


Counseled Regarding:  Diagnosis, Lab results, Need for follow-up, When/why to 

return to ED





Discharge & Departure





 Primary Impression:  


 Vomiting


 Vomiting type:  unspecified  Vomiting Intractability:  non-intractable  Nausea 

presence:  with nausea  Qualified Code:  R11.2 - Nausea with vomiting, 

unspecified


 Additional Impression:  


 Epigastric pain


Disposition:  Home


Discharge Condition


All VS Reviewed:  Yes


Condition:  Stable


Patient Instructions:  Acute Abdominal Pain (ED), Gastritis (ED)





Additional Instructions:


Thank you for entrusting us with your care today. 


Your lab and imaging results are reassuring. 


Follow up with your primary care provider for further evaluation


Take Zofran for nausea as prescribed.


Return to the emergency department in case of black stool, worsening vomiting 

and any new or concerning symptoms.


Referrals:  


KARLENE BAILEY (PCP)


[employer] Attestation


Portions of this note were transcribed by Terry Salguero. I,, 

personally performed the history, physical exam and medical decision-making;I 

reviewed and confirmed the accuracy of the information in the transcribed note. 

Signed by Arya Headley. 17


copies to:   KARLENE BAILEY Christopher W MD Aug 8, 2017 01:22


Terry Salguero Aug 8, 2017 02:19

## 2017-08-08 NOTE — DRSVH
PROCEDURE:  CT ABDOMEN AND PELVIS WITH CONTRAST ([id number])

 

INDICATIONS:  ruq pain

 

TECHNIQUE:  

After the administration of oral and intravenous contrast, 5 mm thick sections acquired from the diap
hragms to the symphysis.  5 mm thick coronal and sagittal reformats were performed.  For radiation do
se reduction, the following was used:  automated exposure control, adjustment of mA and/or kV accordi
ng to patient size.  

 

COMPARISON:  [facility], CT, CT ABD PELVIS W CON, 2/23/2017, 21:37.  [facility], CT, CT ABD PELVIS W CON, 8/30/2015, 17:20.

 

FINDINGS:  

Image quality:  Excellent.  

 

ABDOMEN:  

Lung bases:  Lung bases are clear.  Heart size is normal.  

 

Solid organs:  Liver and spleen are normal in size and enhancement.  Multiple low-density liver nodul
es stable since at least 2015 consistent with benign cysts. Gallbladder is surgically absent.  Biliar
y system is non-dilated.  Pancreas enhances normally.  No adrenal nodules.  Kidneys are normal in siz
e and enhancement, without hydronephrosis.  

 

Peritoneum and bowel:  Stomach, small bowel, and colon loops are normal in caliber and wall thickness
.  Postoperative changes in the proximal stomach. No free fluid or air.  

 

Nodes and vessels:  No retroperitoneal or mesenteric adenopathy.  Aorta and inferior vena cava are no
rmal in caliber.  

 

Miscellaneous:  No ventral hernias.  The left ovary is slightly more superior than typically seen and
 contains multiple cysts measuring up to 2.5 CM.

 

 

PELVIS:  

Genitourinary:  Bladder wall thickness is normal.  

 

Miscellaneous:  No inguinal hernias or adenopathy.  

 

Bones:  No suspicious bony lesions.  No vertebral body compression fractures.  

 

IMPRESSION:  

1. No CT silvia a no discrepancies with the preliminary report.

 

 

 

Dictated by: Shay Sotomayor M.D. on 8/08/2017 at 7:31     

Approved by: Shay Sotomayor M.D. on 8/08/2017 at 7:40

## 2017-08-30 ENCOUNTER — HOSPITAL ENCOUNTER (EMERGENCY)
Dept: HOSPITAL 76 - ED | Age: 42
Discharge: HOME | End: 2017-08-30
Payer: COMMERCIAL

## 2017-08-30 VITALS — SYSTOLIC BLOOD PRESSURE: 121 MMHG | DIASTOLIC BLOOD PRESSURE: 80 MMHG

## 2017-08-30 DIAGNOSIS — Z98.84: ICD-10-CM

## 2017-08-30 DIAGNOSIS — M79.7: ICD-10-CM

## 2017-08-30 DIAGNOSIS — Z87.891: ICD-10-CM

## 2017-08-30 DIAGNOSIS — M26.622: Primary | ICD-10-CM

## 2017-08-30 DIAGNOSIS — Z87.442: ICD-10-CM

## 2017-08-30 PROCEDURE — 99283 EMERGENCY DEPT VISIT LOW MDM: CPT

## 2017-09-21 ENCOUNTER — HOSPITAL ENCOUNTER (EMERGENCY)
Dept: HOSPITAL 76 - ED | Age: 42
Discharge: HOME | End: 2017-09-21
Payer: COMMERCIAL

## 2017-09-21 VITALS — DIASTOLIC BLOOD PRESSURE: 86 MMHG | SYSTOLIC BLOOD PRESSURE: 116 MMHG
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
## 2017-09-21 DIAGNOSIS — S63.641A: Primary | ICD-10-CM

## 2017-09-21 DIAGNOSIS — Z98.84: ICD-10-CM

## 2017-09-21 DIAGNOSIS — M79.7: ICD-10-CM

## 2017-09-21 DIAGNOSIS — Z87.891: ICD-10-CM

## 2017-09-21 PROCEDURE — 99283 EMERGENCY DEPT VISIT LOW MDM: CPT

## 2017-09-25 ENCOUNTER — HOSPITAL ENCOUNTER (EMERGENCY)
Dept: HOSPITAL 76 - ED | Age: 42
Discharge: HOME | End: 2017-09-25
Payer: COMMERCIAL

## 2017-09-25 VITALS — DIASTOLIC BLOOD PRESSURE: 52 MMHG | SYSTOLIC BLOOD PRESSURE: 100 MMHG

## 2017-09-25 DIAGNOSIS — R07.9: Primary | ICD-10-CM

## 2017-09-25 DIAGNOSIS — Z98.84: ICD-10-CM

## 2017-09-25 DIAGNOSIS — M79.7: ICD-10-CM

## 2017-09-25 DIAGNOSIS — Z87.891: ICD-10-CM

## 2017-09-25 LAB
ALBUMIN/GLOB SERPL: 1.3 {RATIO} (ref 1–2.2)
ANION GAP SERPL CALCULATED.4IONS-SCNC: 8 MMOL/L (ref 6–13)
BASOPHILS NFR BLD AUTO: 0.2 10^3/UL (ref 0–0.1)
BASOPHILS NFR BLD AUTO: 2.7 %
BILIRUB BLD-MCNC: 0.8 MG/DL (ref 0.2–1)
BUN SERPL-MCNC: 10 MG/DL (ref 6–20)
CALCIUM UR-MCNC: 8.8 MG/DL (ref 8.5–10.3)
CHLORIDE SERPL-SCNC: 101 MMOL/L (ref 101–111)
CK MB SERPL-MCNC: 1.3 NG/ML (ref 0.6–6.3)
CO2 SERPL-SCNC: 25 MMOL/L (ref 21–32)
CREAT SERPLBLD-SCNC: 0.7 MG/DL (ref 0.4–1)
EOSINOPHIL # BLD AUTO: 0.1 10^3/UL (ref 0–0.7)
EOSINOPHIL NFR BLD AUTO: 1.4 %
ERYTHROCYTE [DISTWIDTH] IN BLOOD BY AUTOMATED COUNT: 23.3 % (ref 12–15)
GFRSERPLBLD MDRD-ARVRAT: 92 ML/MIN/{1.73_M2} (ref 89–?)
GLOBULIN SER-MCNC: 3.2 G/DL (ref 2.1–4.2)
GLUCOSE SERPL-MCNC: 118 MG/DL (ref 70–100)
HCT VFR BLD AUTO: 31.6 % (ref 37–47)
HGB UR QL STRIP: 9.6 G/DL (ref 12–16)
LIPASE SERPL-CCNC: 43 U/L (ref 22–51)
LYMPHOCYTES # SPEC AUTO: 3.4 10^3/UL (ref 1.5–3.5)
LYMPHOCYTES NFR BLD AUTO: 42.9 %
MCH RBC QN AUTO: 21.1 PG (ref 27–31)
MCHC RBC AUTO-ENTMCNC: 30.5 G/DL (ref 32–36)
MCV RBC AUTO: 69.3 FL (ref 81–99)
MONOCYTES # BLD AUTO: 0.5 10^3/UL (ref 0–1)
MONOCYTES NFR BLD AUTO: 6.9 %
NEUTROPHILS # BLD AUTO: 3.6 10^3/UL (ref 1.5–6.6)
NEUTROPHILS # SNV AUTO: 7.9 X10^3/UL (ref 4.8–10.8)
NEUTROPHILS NFR BLD AUTO: 46.1 %
NRBC # BLD AUTO: 0 /100WBC
PDW BLD AUTO: 6.8 FL (ref 7.9–10.8)
PLAT MORPH BLD: (no result)
PLATELET BLD QL SMEAR: (no result)
POTASSIUM SERPL-SCNC: 3.9 MMOL/L (ref 3.5–5)
PROT SPEC-MCNC: 7.2 G/DL (ref 6.7–8.2)
RBC MAR: 4.56 10^6/UL (ref 4.2–5.4)
SODIUM SERPLBLD-SCNC: 134 MMOL/L (ref 135–145)
TROPONIN I SERPL-MCNC: < 0.04 NG/ML (ref ?–0.49)
WBC # BLD: 7.9 X10^3/UL
WBC MORPH BLD: (no result)

## 2017-09-25 PROCEDURE — 96375 TX/PRO/DX INJ NEW DRUG ADDON: CPT

## 2017-09-25 PROCEDURE — 82550 ASSAY OF CK (CPK): CPT

## 2017-09-25 PROCEDURE — 93005 ELECTROCARDIOGRAM TRACING: CPT

## 2017-09-25 PROCEDURE — 36415 COLL VENOUS BLD VENIPUNCTURE: CPT

## 2017-09-25 PROCEDURE — 80053 COMPREHEN METABOLIC PANEL: CPT

## 2017-09-25 PROCEDURE — 96374 THER/PROPH/DIAG INJ IV PUSH: CPT

## 2017-09-25 PROCEDURE — 99284 EMERGENCY DEPT VISIT MOD MDM: CPT

## 2017-09-25 PROCEDURE — 82553 CREATINE MB FRACTION: CPT

## 2017-09-25 PROCEDURE — 85025 COMPLETE CBC W/AUTO DIFF WBC: CPT

## 2017-09-25 PROCEDURE — 71275 CT ANGIOGRAPHY CHEST: CPT

## 2017-09-25 PROCEDURE — 84484 ASSAY OF TROPONIN QUANT: CPT

## 2017-09-25 PROCEDURE — 83690 ASSAY OF LIPASE: CPT

## 2017-10-09 ENCOUNTER — HOSPITAL ENCOUNTER (EMERGENCY)
Dept: HOSPITAL 76 - ED | Age: 42
Discharge: LEFT BEFORE DISCHARGE | End: 2017-10-09
Payer: COMMERCIAL

## 2017-10-09 VITALS — DIASTOLIC BLOOD PRESSURE: 84 MMHG | SYSTOLIC BLOOD PRESSURE: 133 MMHG

## 2017-10-09 DIAGNOSIS — Z53.21: Primary | ICD-10-CM

## 2017-10-10 ENCOUNTER — HOSPITAL ENCOUNTER (EMERGENCY)
Dept: HOSPITAL 76 - ED | Age: 42
Discharge: HOME | End: 2017-10-10
Payer: COMMERCIAL

## 2017-10-10 VITALS — SYSTOLIC BLOOD PRESSURE: 99 MMHG | DIASTOLIC BLOOD PRESSURE: 59 MMHG

## 2017-10-10 DIAGNOSIS — Z98.84: ICD-10-CM

## 2017-10-10 DIAGNOSIS — E86.0: ICD-10-CM

## 2017-10-10 DIAGNOSIS — K85.90: Primary | ICD-10-CM

## 2017-10-10 LAB
ALBUMIN/GLOB SERPL: 1.2 {RATIO} (ref 1–2.2)
ANION GAP SERPL CALCULATED.4IONS-SCNC: 9 MMOL/L (ref 6–13)
BASOPHILS NFR BLD AUTO: 0 10^3/UL (ref 0–0.1)
BASOPHILS NFR BLD AUTO: 0.4 %
BILIRUB BLD-MCNC: 0.7 MG/DL (ref 0.2–1)
BUN SERPL-MCNC: 9 MG/DL (ref 6–20)
CALCIUM UR-MCNC: 8.7 MG/DL (ref 8.5–10.3)
CHLORIDE SERPL-SCNC: 103 MMOL/L (ref 101–111)
CO2 SERPL-SCNC: 23 MMOL/L (ref 21–32)
CREAT SERPLBLD-SCNC: 0.6 MG/DL (ref 0.4–1)
EOSINOPHIL # BLD AUTO: 0.1 10^3/UL (ref 0–0.7)
EOSINOPHIL NFR BLD AUTO: 0.8 %
ERYTHROCYTE [DISTWIDTH] IN BLOOD BY AUTOMATED COUNT: 23.1 % (ref 12–15)
GFRSERPLBLD MDRD-ARVRAT: 110 ML/MIN/{1.73_M2} (ref 89–?)
GLOBULIN SER-MCNC: 3.4 G/DL (ref 2.1–4.2)
GLUCOSE SERPL-MCNC: 103 MG/DL (ref 70–100)
HCT VFR BLD AUTO: 31.8 % (ref 37–47)
HGB UR QL STRIP: 9.8 G/DL (ref 12–16)
LIPASE SERPL-CCNC: 76 U/L (ref 22–51)
LYMPHOCYTES # SPEC AUTO: 1.8 10^3/UL (ref 1.5–3.5)
LYMPHOCYTES NFR BLD AUTO: 26 %
MCH RBC QN AUTO: 21.1 PG (ref 27–31)
MCHC RBC AUTO-ENTMCNC: 30.9 G/DL (ref 32–36)
MCV RBC AUTO: 68.2 FL (ref 81–99)
MONOCYTES # BLD AUTO: 0.4 10^3/UL (ref 0–1)
MONOCYTES NFR BLD AUTO: 5.1 %
NEUTROPHILS # BLD AUTO: 4.8 10^3/UL (ref 1.5–6.6)
NEUTROPHILS # SNV AUTO: 7.1 X10^3/UL (ref 4.8–10.8)
NEUTROPHILS NFR BLD AUTO: 67.7 %
NRBC # BLD AUTO: 0 /100WBC
PDW BLD AUTO: 6.6 FL (ref 7.9–10.8)
PLAT MORPH BLD: (no result)
PLATELET BLD QL SMEAR: (no result)
POTASSIUM SERPL-SCNC: 3.8 MMOL/L (ref 3.5–5)
PROT SPEC-MCNC: 7.4 G/DL (ref 6.7–8.2)
RBC MAR: 4.67 10^6/UL (ref 4.2–5.4)
SODIUM SERPLBLD-SCNC: 135 MMOL/L (ref 135–145)
WBC # BLD: 7.1 X10^3/UL

## 2017-10-10 PROCEDURE — 36415 COLL VENOUS BLD VENIPUNCTURE: CPT

## 2017-10-10 PROCEDURE — 99283 EMERGENCY DEPT VISIT LOW MDM: CPT

## 2017-10-10 PROCEDURE — 96360 HYDRATION IV INFUSION INIT: CPT

## 2017-10-10 PROCEDURE — 83690 ASSAY OF LIPASE: CPT

## 2017-10-10 PROCEDURE — 96361 HYDRATE IV INFUSION ADD-ON: CPT

## 2017-10-10 PROCEDURE — 80053 COMPREHEN METABOLIC PANEL: CPT

## 2017-10-10 PROCEDURE — 85025 COMPLETE CBC W/AUTO DIFF WBC: CPT

## 2017-10-10 RX ADMIN — SODIUM CHLORIDE ONE MLS/HR: 9 INJECTION, SOLUTION INTRAVENOUS at 11:05

## 2017-12-24 ENCOUNTER — HOSPITAL ENCOUNTER (EMERGENCY)
Dept: HOSPITAL 76 - ED | Age: 42
Discharge: HOME | End: 2017-12-24
Payer: COMMERCIAL

## 2017-12-24 VITALS — SYSTOLIC BLOOD PRESSURE: 120 MMHG | DIASTOLIC BLOOD PRESSURE: 72 MMHG

## 2017-12-24 DIAGNOSIS — F17.200: ICD-10-CM

## 2017-12-24 DIAGNOSIS — N83.202: Primary | ICD-10-CM

## 2017-12-24 DIAGNOSIS — R10.2: ICD-10-CM

## 2017-12-24 DIAGNOSIS — M79.7: ICD-10-CM

## 2017-12-24 DIAGNOSIS — Z90.710: ICD-10-CM

## 2017-12-24 LAB
CUL URINE ADD CHARGE: (no result)
PH UR STRIP.AUTO: 6.5 PH (ref 5–7.5)
SP GR UR STRIP.AUTO: 1.02 (ref 1–1.03)
UA CHARGE (STRIP ONLY): YES
UA W/ MICROSCOPIC CHARGE: (no result)
UR CULTURE IF IND: (no result)
UROBILINOGEN UR STRIP.AUTO-MCNC: NEGATIVE MG/DL

## 2017-12-24 PROCEDURE — 93976 VASCULAR STUDY: CPT

## 2017-12-24 PROCEDURE — 81001 URINALYSIS AUTO W/SCOPE: CPT

## 2017-12-24 PROCEDURE — 76856 US EXAM PELVIC COMPLETE: CPT

## 2017-12-24 PROCEDURE — 99283 EMERGENCY DEPT VISIT LOW MDM: CPT

## 2017-12-24 PROCEDURE — 96372 THER/PROPH/DIAG INJ SC/IM: CPT

## 2017-12-24 PROCEDURE — 81003 URINALYSIS AUTO W/O SCOPE: CPT

## 2017-12-24 PROCEDURE — 87086 URINE CULTURE/COLONY COUNT: CPT

## 2018-02-07 ENCOUNTER — HOSPITAL ENCOUNTER (EMERGENCY)
Dept: HOSPITAL 76 - ED | Age: 43
Discharge: HOME | End: 2018-02-07
Payer: COMMERCIAL

## 2018-02-07 ENCOUNTER — HOSPITAL ENCOUNTER (OUTPATIENT)
Dept: HOSPITAL 76 - EMS | Age: 43
Discharge: ANOTHER HOSPITAL | End: 2018-02-07
Attending: SURGERY
Payer: COMMERCIAL

## 2018-02-07 VITALS — DIASTOLIC BLOOD PRESSURE: 62 MMHG | SYSTOLIC BLOOD PRESSURE: 95 MMHG

## 2018-02-07 DIAGNOSIS — R10.30: Primary | ICD-10-CM

## 2018-02-07 DIAGNOSIS — M79.7: ICD-10-CM

## 2018-02-07 DIAGNOSIS — Z87.442: ICD-10-CM

## 2018-02-07 DIAGNOSIS — Z98.84: ICD-10-CM

## 2018-02-07 DIAGNOSIS — Z87.891: ICD-10-CM

## 2018-02-07 DIAGNOSIS — N83.202: Primary | ICD-10-CM

## 2018-02-07 LAB
ALBUMIN DIAFP-MCNC: 4.1 G/DL (ref 3.2–5.5)
ALBUMIN/GLOB SERPL: 1.3 {RATIO} (ref 1–2.2)
ALP SERPL-CCNC: 50 IU/L (ref 42–121)
ALT SERPL W P-5'-P-CCNC: < 10 IU/L (ref 10–60)
ANION GAP SERPL CALCULATED.4IONS-SCNC: 7 MMOL/L (ref 6–13)
AST SERPL W P-5'-P-CCNC: 16 IU/L (ref 10–42)
BASOPHILS NFR BLD AUTO: 0 10^3/UL (ref 0–0.1)
BASOPHILS NFR BLD AUTO: 0.6 %
BILIRUB BLD-MCNC: 0.6 MG/DL (ref 0.2–1)
BUN SERPL-MCNC: 9 MG/DL (ref 6–20)
CALCIUM UR-MCNC: 8.6 MG/DL (ref 8.5–10.3)
CHLORIDE SERPL-SCNC: 108 MMOL/L (ref 101–111)
CLARITY UR REFRACT.AUTO: CLEAR
CO2 SERPL-SCNC: 23 MMOL/L (ref 21–32)
CREAT SERPLBLD-SCNC: 0.6 MG/DL (ref 0.4–1)
EOSINOPHIL # BLD AUTO: 0.1 10^3/UL (ref 0–0.7)
EOSINOPHIL NFR BLD AUTO: 1.4 %
ERYTHROCYTE [DISTWIDTH] IN BLOOD BY AUTOMATED COUNT: 22.2 % (ref 12–15)
GFRSERPLBLD MDRD-ARVRAT: 110 ML/MIN/{1.73_M2} (ref 89–?)
GLOBULIN SER-MCNC: 3.1 G/DL (ref 2.1–4.2)
GLUCOSE SERPL-MCNC: 87 MG/DL (ref 70–100)
GLUCOSE UR QL STRIP.AUTO: NEGATIVE MG/DL
HCG UR QL: NEGATIVE
HGB UR QL STRIP: 9.3 G/DL (ref 12–16)
KETONES UR QL STRIP.AUTO: NEGATIVE MG/DL
LIPASE SERPL-CCNC: 106 U/L (ref 22–51)
LYMPHOCYTES # SPEC AUTO: 1.8 10^3/UL (ref 1.5–3.5)
LYMPHOCYTES NFR BLD AUTO: 30.6 %
MCH RBC QN AUTO: 19.5 PG (ref 27–31)
MCHC RBC AUTO-ENTMCNC: 30.4 G/DL (ref 32–36)
MCV RBC AUTO: 64.3 FL (ref 81–99)
MONOCYTES # BLD AUTO: 0.4 10^3/UL (ref 0–1)
MONOCYTES NFR BLD AUTO: 6.6 %
NEUTROPHILS # BLD AUTO: 3.5 10^3/UL (ref 1.5–6.6)
NEUTROPHILS # SNV AUTO: 5.8 X10^3/UL (ref 4.8–10.8)
NEUTROPHILS NFR BLD AUTO: 60.8 %
NITRITE UR QL STRIP.AUTO: NEGATIVE
PDW BLD AUTO: 8.2 FL (ref 7.9–10.8)
PH UR STRIP.AUTO: 6.5 PH (ref 5–7.5)
PLATELET # BLD: 329 10^3/UL (ref 130–450)
PROT SPEC-MCNC: 7.2 G/DL (ref 6.7–8.2)
PROT UR STRIP.AUTO-MCNC: NEGATIVE MG/DL
RBC # UR STRIP.AUTO: NEGATIVE /UL
RBC MAR: 4.78 10^6/UL (ref 4.2–5.4)
SODIUM SERPLBLD-SCNC: 138 MMOL/L (ref 135–145)
SP GR UR STRIP.AUTO: 1.01 (ref 1–1.03)
SP GR UR STRIP.AUTO: 1.01 (ref 1–1.03)
UROBILINOGEN UR QL STRIP.AUTO: (no result) E.U./DL
UROBILINOGEN UR STRIP.AUTO-MCNC: NEGATIVE MG/DL

## 2018-02-07 PROCEDURE — 80053 COMPREHEN METABOLIC PANEL: CPT

## 2018-02-07 PROCEDURE — 87086 URINE CULTURE/COLONY COUNT: CPT

## 2018-02-07 PROCEDURE — 93975 VASCULAR STUDY: CPT

## 2018-02-07 PROCEDURE — 76856 US EXAM PELVIC COMPLETE: CPT

## 2018-02-07 PROCEDURE — 85025 COMPLETE CBC W/AUTO DIFF WBC: CPT

## 2018-02-07 PROCEDURE — 81001 URINALYSIS AUTO W/SCOPE: CPT

## 2018-02-07 PROCEDURE — 81025 URINE PREGNANCY TEST: CPT

## 2018-02-07 PROCEDURE — 99283 EMERGENCY DEPT VISIT LOW MDM: CPT

## 2018-02-07 PROCEDURE — 83690 ASSAY OF LIPASE: CPT

## 2018-02-07 PROCEDURE — 81003 URINALYSIS AUTO W/O SCOPE: CPT

## 2018-02-07 PROCEDURE — 36415 COLL VENOUS BLD VENIPUNCTURE: CPT

## 2018-05-02 ENCOUNTER — HOSPITAL ENCOUNTER (OUTPATIENT)
Dept: HOSPITAL 76 - DI | Age: 43
Discharge: HOME | End: 2018-05-02
Attending: FAMILY MEDICINE
Payer: COMMERCIAL

## 2018-05-02 DIAGNOSIS — K86.1: ICD-10-CM

## 2018-05-02 DIAGNOSIS — K91.2: ICD-10-CM

## 2018-05-02 DIAGNOSIS — M85.89: Primary | ICD-10-CM

## 2018-05-02 DIAGNOSIS — Z98.84: ICD-10-CM

## 2018-05-02 PROCEDURE — 77080 DXA BONE DENSITY AXIAL: CPT

## 2018-05-09 ENCOUNTER — HOSPITAL ENCOUNTER (OUTPATIENT)
Dept: HOSPITAL 76 - DI | Age: 43
Discharge: HOME | End: 2018-05-09
Attending: SURGERY
Payer: COMMERCIAL

## 2018-05-09 DIAGNOSIS — K92.1: ICD-10-CM

## 2018-05-09 DIAGNOSIS — K92.2: Primary | ICD-10-CM

## 2018-05-09 DIAGNOSIS — D64.9: ICD-10-CM

## 2018-05-09 PROCEDURE — 78278 ACUTE GI BLOOD LOSS IMAGING: CPT

## 2018-05-10 ENCOUNTER — HOSPITAL ENCOUNTER (INPATIENT)
Dept: HOSPITAL 76 - MS3 | Age: 43
LOS: 1 days | Discharge: HOME | DRG: 378 | End: 2018-05-11
Attending: SURGERY | Admitting: SURGERY
Payer: COMMERCIAL

## 2018-05-10 DIAGNOSIS — D50.0: ICD-10-CM

## 2018-05-10 DIAGNOSIS — Z90.49: ICD-10-CM

## 2018-05-10 DIAGNOSIS — K92.1: Primary | ICD-10-CM

## 2018-05-10 DIAGNOSIS — R10.11: ICD-10-CM

## 2018-05-10 DIAGNOSIS — K64.8: ICD-10-CM

## 2018-05-10 DIAGNOSIS — G89.29: ICD-10-CM

## 2018-05-10 DIAGNOSIS — F17.210: ICD-10-CM

## 2018-05-10 DIAGNOSIS — Z98.84: ICD-10-CM

## 2018-05-10 DIAGNOSIS — Z87.19: ICD-10-CM

## 2018-05-10 DIAGNOSIS — Z80.0: ICD-10-CM

## 2018-05-10 DIAGNOSIS — K86.1: ICD-10-CM

## 2018-05-10 LAB
ALBUMIN DIAFP-MCNC: 4.4 G/DL (ref 3.2–5.5)
ALBUMIN/GLOB SERPL: 1.2 {RATIO} (ref 1–2.2)
ALP SERPL-CCNC: 87 IU/L (ref 42–121)
ALT SERPL W P-5'-P-CCNC: 20 IU/L (ref 10–60)
ANION GAP SERPL CALCULATED.4IONS-SCNC: 9 MMOL/L (ref 6–13)
AST SERPL W P-5'-P-CCNC: 27 IU/L (ref 10–42)
BASOPHILS NFR BLD AUTO: 0 10^3/UL (ref 0–0.1)
BASOPHILS NFR BLD AUTO: 0.2 %
BILIRUB BLD-MCNC: 0.5 MG/DL (ref 0.2–1)
BUN SERPL-MCNC: 7 MG/DL (ref 6–20)
CALCIUM UR-MCNC: 9.5 MG/DL (ref 8.5–10.3)
CHLORIDE SERPL-SCNC: 95 MMOL/L (ref 101–111)
CO2 SERPL-SCNC: 29 MMOL/L (ref 21–32)
CREAT SERPLBLD-SCNC: 0.5 MG/DL (ref 0.4–1)
EOSINOPHIL # BLD AUTO: 0.3 10^3/UL (ref 0–0.7)
EOSINOPHIL NFR BLD AUTO: 1.9 %
ERYTHROCYTE [DISTWIDTH] IN BLOOD BY AUTOMATED COUNT: 24.9 % (ref 12–15)
GFRSERPLBLD MDRD-ARVRAT: 136 ML/MIN/{1.73_M2} (ref 89–?)
GLOBULIN SER-MCNC: 3.7 G/DL (ref 2.1–4.2)
GLUCOSE SERPL-MCNC: 96 MG/DL (ref 70–100)
HGB UR QL STRIP: 9 G/DL (ref 12–16)
IRON SATN MFR SERPL: 3 % (ref 20–50)
IRON SERPL-MCNC: 16 UG/DL (ref 28–170)
LYMPHOCYTES # SPEC AUTO: 2.5 10^3/UL (ref 1.5–3.5)
LYMPHOCYTES NFR BLD AUTO: 18 %
MCH RBC QN AUTO: 20.5 PG (ref 27–31)
MCHC RBC AUTO-ENTMCNC: 29.8 G/DL (ref 32–36)
MCV RBC AUTO: 68.6 FL (ref 81–99)
MONOCYTES # BLD AUTO: 0.5 10^3/UL (ref 0–1)
MONOCYTES NFR BLD AUTO: 3.9 %
NEUTROPHILS # BLD AUTO: 10.5 10^3/UL (ref 1.5–6.6)
NEUTROPHILS # SNV AUTO: 13.9 X10^3/UL (ref 4.8–10.8)
NEUTROPHILS NFR BLD AUTO: 76 %
PDW BLD AUTO: 8 FL (ref 7.9–10.8)
PLAT MORPH BLD: (no result)
PLATELET # BLD: 280 10^3/UL (ref 130–450)
PLATELET BLD QL SMEAR: (no result)
PROT SPEC-MCNC: 8.1 G/DL (ref 6.7–8.2)
RBC MAR: 4.38 10^6/UL (ref 4.2–5.4)
RBC MORPH BLD: (no result)
SODIUM SERPLBLD-SCNC: 133 MMOL/L (ref 135–145)
TIBC SERPL-MCNC: 640 UG/DL (ref 250–450)
TRANSFERRIN SERPL-MCNC: 457 MG/DL (ref 192–382)

## 2018-05-10 PROCEDURE — 36415 COLL VENOUS BLD VENIPUNCTURE: CPT

## 2018-05-10 PROCEDURE — 85025 COMPLETE CBC W/AUTO DIFF WBC: CPT

## 2018-05-10 PROCEDURE — 80053 COMPREHEN METABOLIC PANEL: CPT

## 2018-05-10 PROCEDURE — 84466 ASSAY OF TRANSFERRIN: CPT

## 2018-05-10 PROCEDURE — 82728 ASSAY OF FERRITIN: CPT

## 2018-05-10 PROCEDURE — 83540 ASSAY OF IRON: CPT

## 2018-05-10 RX ADMIN — HYDROMORPHONE HYDROCHLORIDE PRN MG: 1 INJECTION, SOLUTION INTRAMUSCULAR; INTRAVENOUS; SUBCUTANEOUS at 17:35

## 2018-05-10 RX ADMIN — SODIUM CHLORIDE SCH MLS/HR: 9 INJECTION, SOLUTION INTRAVENOUS at 17:36

## 2018-05-10 RX ADMIN — SODIUM SULFATE, POTASSIUM SULFATE, MAGNESIUM SULFATE SCH ML: 1.6; 3.13; 17.5 SOLUTION ORAL at 17:55

## 2018-05-10 RX ADMIN — HYDROMORPHONE HYDROCHLORIDE PRN MG: 1 INJECTION, SOLUTION INTRAMUSCULAR; INTRAVENOUS; SUBCUTANEOUS at 22:28

## 2018-05-10 RX ADMIN — HYDROMORPHONE HYDROCHLORIDE PRN MG: 1 INJECTION, SOLUTION INTRAMUSCULAR; INTRAVENOUS; SUBCUTANEOUS at 20:28

## 2018-05-10 RX ADMIN — METOCLOPRAMIDE SCH MG: 5 INJECTION, SOLUTION INTRAMUSCULAR; INTRAVENOUS at 22:00

## 2018-05-10 RX ADMIN — METOCLOPRAMIDE SCH MG: 5 INJECTION, SOLUTION INTRAMUSCULAR; INTRAVENOUS at 17:36

## 2018-05-10 RX ADMIN — SODIUM CHLORIDE, PRESERVATIVE FREE SCH ML: 5 INJECTION INTRAVENOUS at 17:36

## 2018-05-10 RX ADMIN — SODIUM SULFATE, POTASSIUM SULFATE, MAGNESIUM SULFATE SCH ML: 1.6; 3.13; 17.5 SOLUTION ORAL at 17:58

## 2018-05-11 VITALS — SYSTOLIC BLOOD PRESSURE: 112 MMHG | DIASTOLIC BLOOD PRESSURE: 78 MMHG

## 2018-05-11 LAB
BASOPHILS NFR BLD AUTO: 0 10^3/UL (ref 0–0.1)
BASOPHILS NFR BLD AUTO: 0.2 %
EOSINOPHIL # BLD AUTO: 0.2 10^3/UL (ref 0–0.7)
EOSINOPHIL NFR BLD AUTO: 2.7 %
ERYTHROCYTE [DISTWIDTH] IN BLOOD BY AUTOMATED COUNT: 24.6 % (ref 12–15)
HGB UR QL STRIP: 8.9 G/DL (ref 12–16)
LYMPHOCYTES # SPEC AUTO: 2.5 10^3/UL (ref 1.5–3.5)
LYMPHOCYTES NFR BLD AUTO: 29.7 %
MCH RBC QN AUTO: 20.6 PG (ref 27–31)
MCHC RBC AUTO-ENTMCNC: 30 G/DL (ref 32–36)
MCV RBC AUTO: 68.5 FL (ref 81–99)
MONOCYTES # BLD AUTO: 0.4 10^3/UL (ref 0–1)
MONOCYTES NFR BLD AUTO: 5.2 %
NEUTROPHILS # BLD AUTO: 5.2 10^3/UL (ref 1.5–6.6)
NEUTROPHILS # SNV AUTO: 8.4 X10^3/UL (ref 4.8–10.8)
NEUTROPHILS NFR BLD AUTO: 62.2 %
PDW BLD AUTO: 8.1 FL (ref 7.9–10.8)
PLAT MORPH BLD: (no result)
PLATELET # BLD: 253 10^3/UL (ref 130–450)
PLATELET BLD QL SMEAR: (no result)
RBC MAR: 4.31 10^6/UL (ref 4.2–5.4)
RBC MORPH BLD: (no result)

## 2018-05-11 PROCEDURE — 0DJD8ZZ INSPECTION OF LOWER INTESTINAL TRACT, VIA NATURAL OR ARTIFICIAL OPENING ENDOSCOPIC: ICD-10-PCS | Performed by: SURGERY

## 2018-05-11 RX ADMIN — HYDROMORPHONE HYDROCHLORIDE PRN MG: 1 INJECTION, SOLUTION INTRAMUSCULAR; INTRAVENOUS; SUBCUTANEOUS at 09:34

## 2018-05-11 RX ADMIN — HYDROMORPHONE HYDROCHLORIDE PRN MG: 1 INJECTION, SOLUTION INTRAMUSCULAR; INTRAVENOUS; SUBCUTANEOUS at 06:06

## 2018-05-11 RX ADMIN — HYDROMORPHONE HYDROCHLORIDE PRN MG: 1 INJECTION, SOLUTION INTRAMUSCULAR; INTRAVENOUS; SUBCUTANEOUS at 02:31

## 2018-05-11 RX ADMIN — METOCLOPRAMIDE SCH MG: 5 INJECTION, SOLUTION INTRAMUSCULAR; INTRAVENOUS at 05:07

## 2018-05-11 RX ADMIN — SODIUM CHLORIDE, PRESERVATIVE FREE SCH: 5 INJECTION INTRAVENOUS at 07:28

## 2018-05-11 RX ADMIN — SODIUM CHLORIDE SCH MLS/HR: 9 INJECTION, SOLUTION INTRAVENOUS at 03:24

## 2018-05-11 RX ADMIN — SODIUM CHLORIDE, PRESERVATIVE FREE SCH: 5 INJECTION INTRAVENOUS at 00:37

## 2018-05-11 RX ADMIN — HYDROMORPHONE HYDROCHLORIDE PRN MG: 1 INJECTION, SOLUTION INTRAMUSCULAR; INTRAVENOUS; SUBCUTANEOUS at 00:40

## 2018-05-13 ENCOUNTER — HOSPITAL ENCOUNTER (INPATIENT)
Dept: HOSPITAL 76 - ED | Age: 43
LOS: 6 days | Discharge: HOME | DRG: 193 | End: 2018-05-19
Attending: NURSE PRACTITIONER | Admitting: NURSE PRACTITIONER
Payer: COMMERCIAL

## 2018-05-13 DIAGNOSIS — K86.1: ICD-10-CM

## 2018-05-13 DIAGNOSIS — F41.0: ICD-10-CM

## 2018-05-13 DIAGNOSIS — G89.29: ICD-10-CM

## 2018-05-13 DIAGNOSIS — Z87.442: ICD-10-CM

## 2018-05-13 DIAGNOSIS — R09.02: ICD-10-CM

## 2018-05-13 DIAGNOSIS — F12.980: ICD-10-CM

## 2018-05-13 DIAGNOSIS — D50.9: ICD-10-CM

## 2018-05-13 DIAGNOSIS — J18.9: Primary | ICD-10-CM

## 2018-05-13 DIAGNOSIS — M79.7: ICD-10-CM

## 2018-05-13 DIAGNOSIS — K85.90: ICD-10-CM

## 2018-05-13 DIAGNOSIS — F32.9: ICD-10-CM

## 2018-05-13 DIAGNOSIS — Z98.84: ICD-10-CM

## 2018-05-13 LAB
ALBUMIN DIAFP-MCNC: 3.3 G/DL (ref 3.2–5.5)
ALBUMIN/GLOB SERPL: 0.9 {RATIO} (ref 1–2.2)
ALP SERPL-CCNC: 80 IU/L (ref 42–121)
ALT SERPL W P-5'-P-CCNC: 11 IU/L (ref 10–60)
AMPHET UR QL SCN: NEGATIVE
ANION GAP SERPL CALCULATED.4IONS-SCNC: 11 MMOL/L (ref 6–13)
AST SERPL W P-5'-P-CCNC: 25 IU/L (ref 10–42)
BASOPHILS NFR BLD AUTO: 0 10^3/UL (ref 0–0.1)
BASOPHILS NFR BLD AUTO: 0.4 %
BENZODIAZ UR QL SCN: NEGATIVE
BILIRUB BLD-MCNC: 1.2 MG/DL (ref 0.2–1)
BUN SERPL-MCNC: 7 MG/DL (ref 6–20)
CALCIUM UR-MCNC: 9 MG/DL (ref 8.5–10.3)
CHLORIDE SERPL-SCNC: 98 MMOL/L (ref 101–111)
CLARITY UR REFRACT.AUTO: (no result)
CO2 SERPL-SCNC: 25 MMOL/L (ref 21–32)
COCAINE UR-SCNC: NEGATIVE UMOL/L
CREAT SERPLBLD-SCNC: 0.6 MG/DL (ref 0.4–1)
EOSINOPHIL # BLD AUTO: 0.2 10^3/UL (ref 0–0.7)
EOSINOPHIL NFR BLD AUTO: 1.9 %
ERYTHROCYTE [DISTWIDTH] IN BLOOD BY AUTOMATED COUNT: 24.6 % (ref 12–15)
GFRSERPLBLD MDRD-ARVRAT: 110 ML/MIN/{1.73_M2} (ref 89–?)
GLOBULIN SER-MCNC: 3.7 G/DL (ref 2.1–4.2)
GLUCOSE SERPL-MCNC: 102 MG/DL (ref 70–100)
GLUCOSE UR QL STRIP.AUTO: NEGATIVE MG/DL
HCG UR QL: NEGATIVE
HGB UR QL STRIP: 8.1 G/DL (ref 12–16)
INR PPP: 1 (ref 0.8–1.2)
KETONES UR QL STRIP.AUTO: (no result) MG/DL
LIPASE SERPL-CCNC: 18 U/L (ref 22–51)
LYMPHOCYTES # SPEC AUTO: 1.4 10^3/UL (ref 1.5–3.5)
LYMPHOCYTES NFR BLD AUTO: 14.2 %
MCH RBC QN AUTO: 21 PG (ref 27–31)
MCHC RBC AUTO-ENTMCNC: 30.8 G/DL (ref 32–36)
MCV RBC AUTO: 68 FL (ref 81–99)
METHADONE UR QL SCN: NEGATIVE
METHAMPHET UR QL SCN: NEGATIVE
MONOCYTES # BLD AUTO: 0.4 10^3/UL (ref 0–1)
MONOCYTES NFR BLD AUTO: 4.2 %
NEUTROPHILS # BLD AUTO: 7.8 10^3/UL (ref 1.5–6.6)
NEUTROPHILS # SNV AUTO: 9.9 X10^3/UL (ref 4.8–10.8)
NEUTROPHILS NFR BLD AUTO: 79.3 %
NITRITE UR QL STRIP.AUTO: NEGATIVE
OPIATES UR QL SCN: POSITIVE
PDW BLD AUTO: 8 FL (ref 7.9–10.8)
PH UR STRIP.AUTO: 7.5 PH (ref 5–7.5)
PLAT MORPH BLD: (no result)
PLATELET # BLD: 224 10^3/UL (ref 130–450)
PLATELET BLD QL SMEAR: (no result)
PROT SPEC-MCNC: 7 G/DL (ref 6.7–8.2)
PROT UR STRIP.AUTO-MCNC: NEGATIVE MG/DL
PROTHROM ACT/NOR PPP: 11.7 SECS (ref 9.9–12.6)
RBC # UR STRIP.AUTO: (no result) /UL
RBC MAR: 3.88 10^6/UL (ref 4.2–5.4)
RBC MORPH BLD: (no result)
SODIUM SERPLBLD-SCNC: 134 MMOL/L (ref 135–145)
SP GR UR STRIP.AUTO: 1.01 (ref 1–1.03)
SQUAMOUS URNS QL MICRO: (no result)
UROBILINOGEN UR QL STRIP.AUTO: 2 E.U./DL
UROBILINOGEN UR STRIP.AUTO-MCNC: NEGATIVE MG/DL
VOLATILE DRUGS POS SERPL SCN: (no result)

## 2018-05-13 PROCEDURE — 87086 URINE CULTURE/COLONY COUNT: CPT

## 2018-05-13 PROCEDURE — 81001 URINALYSIS AUTO W/SCOPE: CPT

## 2018-05-13 PROCEDURE — 81003 URINALYSIS AUTO W/O SCOPE: CPT

## 2018-05-13 PROCEDURE — 93005 ELECTROCARDIOGRAM TRACING: CPT

## 2018-05-13 PROCEDURE — 86850 RBC ANTIBODY SCREEN: CPT

## 2018-05-13 PROCEDURE — 82150 ASSAY OF AMYLASE: CPT

## 2018-05-13 PROCEDURE — 85651 RBC SED RATE NONAUTOMATED: CPT

## 2018-05-13 PROCEDURE — 71275 CT ANGIOGRAPHY CHEST: CPT

## 2018-05-13 PROCEDURE — 87040 BLOOD CULTURE FOR BACTERIA: CPT

## 2018-05-13 PROCEDURE — 80320 DRUG SCREEN QUANTALCOHOLS: CPT

## 2018-05-13 PROCEDURE — 87070 CULTURE OTHR SPECIMN AEROBIC: CPT

## 2018-05-13 PROCEDURE — 84466 ASSAY OF TRANSFERRIN: CPT

## 2018-05-13 PROCEDURE — 96374 THER/PROPH/DIAG INJ IV PUSH: CPT

## 2018-05-13 PROCEDURE — 83735 ASSAY OF MAGNESIUM: CPT

## 2018-05-13 PROCEDURE — 83615 LACTATE (LD) (LDH) ENZYME: CPT

## 2018-05-13 PROCEDURE — 86900 BLOOD TYPING SEROLOGIC ABO: CPT

## 2018-05-13 PROCEDURE — 85730 THROMBOPLASTIN TIME PARTIAL: CPT

## 2018-05-13 PROCEDURE — 83540 ASSAY OF IRON: CPT

## 2018-05-13 PROCEDURE — 85018 HEMOGLOBIN: CPT

## 2018-05-13 PROCEDURE — 82330 ASSAY OF CALCIUM: CPT

## 2018-05-13 PROCEDURE — 82728 ASSAY OF FERRITIN: CPT

## 2018-05-13 PROCEDURE — 86140 C-REACTIVE PROTEIN: CPT

## 2018-05-13 PROCEDURE — 82607 VITAMIN B-12: CPT

## 2018-05-13 PROCEDURE — 80053 COMPREHEN METABOLIC PANEL: CPT

## 2018-05-13 PROCEDURE — 85610 PROTHROMBIN TIME: CPT

## 2018-05-13 PROCEDURE — 86901 BLOOD TYPING SEROLOGIC RH(D): CPT

## 2018-05-13 PROCEDURE — 82270 OCCULT BLOOD FECES: CPT

## 2018-05-13 PROCEDURE — 81025 URINE PREGNANCY TEST: CPT

## 2018-05-13 PROCEDURE — 78290 INTESTINE IMAGING: CPT

## 2018-05-13 PROCEDURE — 99283 EMERGENCY DEPT VISIT LOW MDM: CPT

## 2018-05-13 PROCEDURE — 85014 HEMATOCRIT: CPT

## 2018-05-13 PROCEDURE — 36415 COLL VENOUS BLD VENIPUNCTURE: CPT

## 2018-05-13 PROCEDURE — 83690 ASSAY OF LIPASE: CPT

## 2018-05-13 PROCEDURE — 86920 COMPATIBILITY TEST SPIN: CPT

## 2018-05-13 PROCEDURE — 94640 AIRWAY INHALATION TREATMENT: CPT

## 2018-05-13 PROCEDURE — 99284 EMERGENCY DEPT VISIT MOD MDM: CPT

## 2018-05-13 PROCEDURE — 80306 DRUG TEST PRSMV INSTRMNT: CPT

## 2018-05-13 PROCEDURE — 87205 SMEAR GRAM STAIN: CPT

## 2018-05-13 PROCEDURE — 99285 EMERGENCY DEPT VISIT HI MDM: CPT

## 2018-05-13 PROCEDURE — 85025 COMPLETE CBC W/AUTO DIFF WBC: CPT

## 2018-05-13 PROCEDURE — 85044 MANUAL RETICULOCYTE COUNT: CPT

## 2018-05-13 RX ADMIN — HYDROMORPHONE HYDROCHLORIDE PRN MG: 1 INJECTION, SOLUTION INTRAMUSCULAR; INTRAVENOUS; SUBCUTANEOUS at 21:15

## 2018-05-13 RX ADMIN — ZOLPIDEM TARTRATE SCH MG: 5 TABLET, FILM COATED ORAL at 21:15

## 2018-05-13 RX ADMIN — SODIUM CHLORIDE, PRESERVATIVE FREE SCH ML: 5 INJECTION INTRAVENOUS at 20:06

## 2018-05-13 RX ADMIN — OXYCODONE PRN MG: 5 TABLET ORAL at 21:22

## 2018-05-13 RX ADMIN — METOCLOPRAMIDE SCH MG: 10 TABLET ORAL at 21:15

## 2018-05-13 RX ADMIN — IPRATROPIUM BROMIDE AND ALBUTEROL SULFATE SCH ML: 2.5; .5 SOLUTION RESPIRATORY (INHALATION) at 19:50

## 2018-05-14 LAB
ALBUMIN DIAFP-MCNC: 2.9 G/DL (ref 3.2–5.5)
ALBUMIN/GLOB SERPL: 0.9 {RATIO} (ref 1–2.2)
ALP SERPL-CCNC: 64 IU/L (ref 42–121)
ALT SERPL W P-5'-P-CCNC: 11 IU/L (ref 10–60)
AMYLASE SERPL-CCNC: 53 U/L (ref 28–100)
ANION GAP SERPL CALCULATED.4IONS-SCNC: 8 MMOL/L (ref 6–13)
AST SERPL W P-5'-P-CCNC: 19 IU/L (ref 10–42)
BASOPHILS NFR BLD AUTO: 0 10^3/UL (ref 0–0.1)
BASOPHILS NFR BLD AUTO: 0.3 %
BILIRUB BLD-MCNC: 0.9 MG/DL (ref 0.2–1)
BUN SERPL-MCNC: 7 MG/DL (ref 6–20)
CALCIUM UR-MCNC: 7.9 MG/DL (ref 8.5–10.3)
CHLORIDE SERPL-SCNC: 102 MMOL/L (ref 101–111)
CO2 SERPL-SCNC: 25 MMOL/L (ref 21–32)
CREAT SERPLBLD-SCNC: 0.5 MG/DL (ref 0.4–1)
CRP SERPL-MCNC: 11.2 MG/DL (ref 0–1)
EOSINOPHIL # BLD AUTO: 0.3 10^3/UL (ref 0–0.7)
EOSINOPHIL NFR BLD AUTO: 4.8 %
ERYTHROCYTE [DISTWIDTH] IN BLOOD BY AUTOMATED COUNT: 25.1 % (ref 12–15)
GFRSERPLBLD MDRD-ARVRAT: 136 ML/MIN/{1.73_M2} (ref 89–?)
GLOBULIN SER-MCNC: 3.1 G/DL (ref 2.1–4.2)
GLUCOSE SERPL-MCNC: 113 MG/DL (ref 70–100)
HGB UR QL STRIP: 7.4 G/DL (ref 12–16)
LYMPHOCYTES # SPEC AUTO: 1 10^3/UL (ref 1.5–3.5)
LYMPHOCYTES NFR BLD AUTO: 16.4 %
MCH RBC QN AUTO: 20.9 PG (ref 27–31)
MCHC RBC AUTO-ENTMCNC: 30.4 G/DL (ref 32–36)
MCV RBC AUTO: 68.7 FL (ref 81–99)
MONOCYTES # BLD AUTO: 0.5 10^3/UL (ref 0–1)
MONOCYTES NFR BLD AUTO: 7.8 %
NEUTROPHILS # BLD AUTO: 4.3 10^3/UL (ref 1.5–6.6)
NEUTROPHILS # SNV AUTO: 6.1 X10^3/UL (ref 4.8–10.8)
NEUTROPHILS NFR BLD AUTO: 70.7 %
PDW BLD AUTO: 8.2 FL (ref 7.9–10.8)
PLAT MORPH BLD: (no result)
PLATELET # BLD: 188 10^3/UL (ref 130–450)
PLATELET BLD QL SMEAR: (no result)
PROT SPEC-MCNC: 6 G/DL (ref 6.7–8.2)
RBC MAR: 3.56 10^6/UL (ref 4.2–5.4)
RBC MORPH BLD: (no result)
SODIUM SERPLBLD-SCNC: 135 MMOL/L (ref 135–145)

## 2018-05-14 RX ADMIN — SODIUM CHLORIDE, PRESERVATIVE FREE SCH: 5 INJECTION INTRAVENOUS at 18:18

## 2018-05-14 RX ADMIN — HYDROMORPHONE HYDROCHLORIDE PRN MG: 1 INJECTION, SOLUTION INTRAMUSCULAR; INTRAVENOUS; SUBCUTANEOUS at 05:00

## 2018-05-14 RX ADMIN — HYDROMORPHONE HYDROCHLORIDE PRN MG: 1 INJECTION, SOLUTION INTRAMUSCULAR; INTRAVENOUS; SUBCUTANEOUS at 12:31

## 2018-05-14 RX ADMIN — IPRATROPIUM BROMIDE AND ALBUTEROL SULFATE SCH: 2.5; .5 SOLUTION RESPIRATORY (INHALATION) at 20:01

## 2018-05-14 RX ADMIN — OXYCODONE PRN MG: 5 TABLET ORAL at 14:29

## 2018-05-14 RX ADMIN — SODIUM CHLORIDE AND POTASSIUM CHLORIDE SCH MLS/HR: 9; 1.49 INJECTION, SOLUTION INTRAVENOUS at 08:11

## 2018-05-14 RX ADMIN — OXYCODONE PRN MG: 5 TABLET ORAL at 05:00

## 2018-05-14 RX ADMIN — SODIUM CHLORIDE SCH MLS/HR: 9 INJECTION, SOLUTION INTRAVENOUS at 09:43

## 2018-05-14 RX ADMIN — METOCLOPRAMIDE SCH MG: 10 TABLET ORAL at 09:44

## 2018-05-14 RX ADMIN — HYDROMORPHONE HYDROCHLORIDE PRN MG: 1 INJECTION, SOLUTION INTRAMUSCULAR; INTRAVENOUS; SUBCUTANEOUS at 14:30

## 2018-05-14 RX ADMIN — SODIUM CHLORIDE, PRESERVATIVE FREE PRN ML: 5 INJECTION INTRAVENOUS at 05:01

## 2018-05-14 RX ADMIN — IPRATROPIUM BROMIDE AND ALBUTEROL SULFATE SCH ML: 2.5; .5 SOLUTION RESPIRATORY (INHALATION) at 07:35

## 2018-05-14 RX ADMIN — SODIUM CHLORIDE, PRESERVATIVE FREE SCH ML: 5 INJECTION INTRAVENOUS at 02:42

## 2018-05-14 RX ADMIN — HYDROMORPHONE HYDROCHLORIDE PRN MG: 1 INJECTION, SOLUTION INTRAMUSCULAR; INTRAVENOUS; SUBCUTANEOUS at 21:54

## 2018-05-14 RX ADMIN — HYDROMORPHONE HYDROCHLORIDE PRN MG: 1 INJECTION, SOLUTION INTRAMUSCULAR; INTRAVENOUS; SUBCUTANEOUS at 16:41

## 2018-05-14 RX ADMIN — SODIUM CHLORIDE SCH MLS/HR: 9 INJECTION, SOLUTION INTRAVENOUS at 22:06

## 2018-05-14 RX ADMIN — SODIUM CHLORIDE SCH MLS/HR: 9 INJECTION, SOLUTION INTRAVENOUS at 14:36

## 2018-05-14 RX ADMIN — POLYETHYLENE GLYCOL 3350 SCH: 17 POWDER, FOR SOLUTION ORAL at 10:18

## 2018-05-14 RX ADMIN — HYDROMORPHONE HYDROCHLORIDE PRN MG: 1 INJECTION, SOLUTION INTRAMUSCULAR; INTRAVENOUS; SUBCUTANEOUS at 09:45

## 2018-05-14 RX ADMIN — MORPHINE SULFATE SCH MG: 15 TABLET ORAL at 22:05

## 2018-05-14 RX ADMIN — METOCLOPRAMIDE SCH MG: 10 TABLET ORAL at 18:24

## 2018-05-14 RX ADMIN — SODIUM CHLORIDE, PRESERVATIVE FREE SCH ML: 5 INJECTION INTRAVENOUS at 08:11

## 2018-05-14 RX ADMIN — MORPHINE SULFATE SCH MG: 15 TABLET ORAL at 18:24

## 2018-05-14 RX ADMIN — SODIUM CHLORIDE SCH MLS/HR: 9 INJECTION, SOLUTION INTRAVENOUS at 02:59

## 2018-05-14 RX ADMIN — IPRATROPIUM BROMIDE AND ALBUTEROL SULFATE SCH ML: 2.5; .5 SOLUTION RESPIRATORY (INHALATION) at 13:42

## 2018-05-14 RX ADMIN — METOCLOPRAMIDE SCH MG: 10 TABLET ORAL at 21:54

## 2018-05-14 RX ADMIN — SODIUM CHLORIDE AND POTASSIUM CHLORIDE SCH MLS/HR: 9; 1.49 INJECTION, SOLUTION INTRAVENOUS at 18:17

## 2018-05-14 RX ADMIN — HYDROMORPHONE HYDROCHLORIDE PRN MG: 1 INJECTION, SOLUTION INTRAMUSCULAR; INTRAVENOUS; SUBCUTANEOUS at 19:16

## 2018-05-14 RX ADMIN — ZOLPIDEM TARTRATE SCH MG: 5 TABLET, FILM COATED ORAL at 21:54

## 2018-05-14 RX ADMIN — HYDROMORPHONE HYDROCHLORIDE PRN MG: 1 INJECTION, SOLUTION INTRAMUSCULAR; INTRAVENOUS; SUBCUTANEOUS at 02:42

## 2018-05-14 RX ADMIN — METOCLOPRAMIDE SCH MG: 10 TABLET ORAL at 14:28

## 2018-05-15 LAB
ALBUMIN DIAFP-MCNC: 2.8 G/DL (ref 3.2–5.5)
ALBUMIN/GLOB SERPL: 0.9 {RATIO} (ref 1–2.2)
ALP SERPL-CCNC: 59 IU/L (ref 42–121)
ALT SERPL W P-5'-P-CCNC: 11 IU/L (ref 10–60)
AMYLASE SERPL-CCNC: 138 U/L (ref 28–100)
ANION GAP SERPL CALCULATED.4IONS-SCNC: 7 MMOL/L (ref 6–13)
AST SERPL W P-5'-P-CCNC: 18 IU/L (ref 10–42)
BASOPHILS NFR BLD AUTO: 0 10^3/UL (ref 0–0.1)
BASOPHILS NFR BLD AUTO: 0.8 %
BILIRUB BLD-MCNC: 1.3 MG/DL (ref 0.2–1)
BUN SERPL-MCNC: 6 MG/DL (ref 6–20)
CALCIUM UR-MCNC: 7.9 MG/DL (ref 8.5–10.3)
CHLORIDE SERPL-SCNC: 109 MMOL/L (ref 101–111)
CO2 SERPL-SCNC: 23 MMOL/L (ref 21–32)
CREAT SERPLBLD-SCNC: 0.5 MG/DL (ref 0.4–1)
CRP SERPL-MCNC: 4.4 MG/DL (ref 0–1)
EOSINOPHIL # BLD AUTO: 0.3 10^3/UL (ref 0–0.7)
EOSINOPHIL NFR BLD AUTO: 6.8 %
ERYTHROCYTE [DISTWIDTH] IN BLOOD BY AUTOMATED COUNT: 24.8 % (ref 12–15)
FERRITIN SERPL-MCNC: 26 NG/ML (ref 11–306.8)
GFRSERPLBLD MDRD-ARVRAT: 136 ML/MIN/{1.73_M2} (ref 89–?)
GLOBULIN SER-MCNC: 3 G/DL (ref 2.1–4.2)
GLUCOSE SERPL-MCNC: 87 MG/DL (ref 70–100)
HGB UR QL STRIP: 10 G/DL (ref 12–16)
HGB UR QL STRIP: 9 G/DL (ref 12–16)
IRON SATN MFR SERPL: 35 % (ref 20–50)
IRON SERPL-MCNC: 140 UG/DL (ref 28–170)
LYMPHOCYTES # SPEC AUTO: 1.6 10^3/UL (ref 1.5–3.5)
LYMPHOCYTES NFR BLD AUTO: 32.3 %
MCH RBC QN AUTO: 22 PG (ref 27–31)
MCHC RBC AUTO-ENTMCNC: 31.2 G/DL (ref 32–36)
MCV RBC AUTO: 70.7 FL (ref 81–99)
MEAN RETIC VALUE: 118.8
MONOCYTES # BLD AUTO: 0.5 10^3/UL (ref 0–1)
MONOCYTES NFR BLD AUTO: 11.1 %
NEUTROPHILS # BLD AUTO: 2.4 10^3/UL (ref 1.5–6.6)
NEUTROPHILS # SNV AUTO: 4.9 X10^3/UL (ref 4.8–10.8)
NEUTROPHILS NFR BLD AUTO: 49 %
PDW BLD AUTO: 8.2 FL (ref 7.9–10.8)
PLATELET # BLD: 192 10^3/UL (ref 130–450)
PROT SPEC-MCNC: 5.8 G/DL (ref 6.7–8.2)
RBC MAR: 4.07 10^6/UL (ref 4.2–5.4)
RBC MAR: 4.08 10^6/UL (ref 4.2–5.4)
SODIUM SERPLBLD-SCNC: 139 MMOL/L (ref 135–145)
TIBC SERPL-MCNC: 405 UG/DL (ref 250–450)
TRANSFERRIN SERPL-MCNC: 289 MG/DL (ref 192–382)
VIT B12 SERPL-MCNC: 183 PG/ML (ref 180–914)

## 2018-05-15 RX ADMIN — HYDROMORPHONE HYDROCHLORIDE PRN MG: 1 INJECTION, SOLUTION INTRAMUSCULAR; INTRAVENOUS; SUBCUTANEOUS at 11:53

## 2018-05-15 RX ADMIN — SODIUM CHLORIDE SCH MLS/HR: 900 INJECTION INTRAVENOUS at 21:32

## 2018-05-15 RX ADMIN — SODIUM CHLORIDE, PRESERVATIVE FREE PRN ML: 5 INJECTION INTRAVENOUS at 07:01

## 2018-05-15 RX ADMIN — SODIUM CHLORIDE, PRESERVATIVE FREE SCH ML: 5 INJECTION INTRAVENOUS at 00:32

## 2018-05-15 RX ADMIN — HYDROMORPHONE HYDROCHLORIDE PRN MG: 1 INJECTION, SOLUTION INTRAMUSCULAR; INTRAVENOUS; SUBCUTANEOUS at 02:44

## 2018-05-15 RX ADMIN — SODIUM CHLORIDE AND POTASSIUM CHLORIDE SCH MLS/HR: 9; 1.49 INJECTION, SOLUTION INTRAVENOUS at 11:53

## 2018-05-15 RX ADMIN — SODIUM CHLORIDE, PRESERVATIVE FREE SCH ML: 5 INJECTION INTRAVENOUS at 23:57

## 2018-05-15 RX ADMIN — SODIUM CHLORIDE SCH MLS/HR: 9 INJECTION, SOLUTION INTRAVENOUS at 09:22

## 2018-05-15 RX ADMIN — METOCLOPRAMIDE SCH MG: 10 TABLET ORAL at 09:22

## 2018-05-15 RX ADMIN — MORPHINE SULFATE SCH MG: 15 TABLET ORAL at 22:25

## 2018-05-15 RX ADMIN — METOCLOPRAMIDE SCH MG: 10 TABLET ORAL at 16:53

## 2018-05-15 RX ADMIN — METOCLOPRAMIDE SCH MG: 10 TABLET ORAL at 11:53

## 2018-05-15 RX ADMIN — HYDROMORPHONE HYDROCHLORIDE PRN MG: 1 INJECTION, SOLUTION INTRAMUSCULAR; INTRAVENOUS; SUBCUTANEOUS at 14:23

## 2018-05-15 RX ADMIN — SODIUM CHLORIDE, PRESERVATIVE FREE PRN ML: 5 INJECTION INTRAVENOUS at 18:58

## 2018-05-15 RX ADMIN — HYDROMORPHONE HYDROCHLORIDE PRN MG: 1 INJECTION, SOLUTION INTRAMUSCULAR; INTRAVENOUS; SUBCUTANEOUS at 00:29

## 2018-05-15 RX ADMIN — SODIUM CHLORIDE AND POTASSIUM CHLORIDE SCH MLS/HR: 9; 1.49 INJECTION, SOLUTION INTRAVENOUS at 05:35

## 2018-05-15 RX ADMIN — MORPHINE SULFATE SCH MG: 15 TABLET ORAL at 04:07

## 2018-05-15 RX ADMIN — SODIUM CHLORIDE, PRESERVATIVE FREE PRN ML: 5 INJECTION INTRAVENOUS at 14:24

## 2018-05-15 RX ADMIN — ZOLPIDEM TARTRATE SCH MG: 5 TABLET, FILM COATED ORAL at 21:31

## 2018-05-15 RX ADMIN — HYDROMORPHONE HYDROCHLORIDE PRN MG: 1 INJECTION, SOLUTION INTRAMUSCULAR; INTRAVENOUS; SUBCUTANEOUS at 21:43

## 2018-05-15 RX ADMIN — IPRATROPIUM BROMIDE AND ALBUTEROL SULFATE SCH: 2.5; .5 SOLUTION RESPIRATORY (INHALATION) at 11:24

## 2018-05-15 RX ADMIN — HYDROMORPHONE HYDROCHLORIDE PRN MG: 1 INJECTION, SOLUTION INTRAMUSCULAR; INTRAVENOUS; SUBCUTANEOUS at 23:56

## 2018-05-15 RX ADMIN — HYDROMORPHONE HYDROCHLORIDE PRN MG: 1 INJECTION, SOLUTION INTRAMUSCULAR; INTRAVENOUS; SUBCUTANEOUS at 16:53

## 2018-05-15 RX ADMIN — POLYETHYLENE GLYCOL 3350 SCH: 17 POWDER, FOR SOLUTION ORAL at 07:56

## 2018-05-15 RX ADMIN — MORPHINE SULFATE SCH MG: 15 TABLET ORAL at 10:06

## 2018-05-15 RX ADMIN — MORPHINE SULFATE SCH MG: 15 TABLET ORAL at 15:54

## 2018-05-15 RX ADMIN — HYDROMORPHONE HYDROCHLORIDE PRN MG: 1 INJECTION, SOLUTION INTRAMUSCULAR; INTRAVENOUS; SUBCUTANEOUS at 18:57

## 2018-05-15 RX ADMIN — SODIUM CHLORIDE, PRESERVATIVE FREE SCH ML: 5 INJECTION INTRAVENOUS at 16:53

## 2018-05-15 RX ADMIN — HYDROMORPHONE HYDROCHLORIDE PRN MG: 1 INJECTION, SOLUTION INTRAMUSCULAR; INTRAVENOUS; SUBCUTANEOUS at 09:21

## 2018-05-15 RX ADMIN — SODIUM CHLORIDE SCH MLS/HR: 9 INJECTION, SOLUTION INTRAVENOUS at 03:53

## 2018-05-15 RX ADMIN — SODIUM CHLORIDE, PRESERVATIVE FREE PRN ML: 5 INJECTION INTRAVENOUS at 21:43

## 2018-05-15 RX ADMIN — HYDROMORPHONE HYDROCHLORIDE PRN MG: 1 INJECTION, SOLUTION INTRAMUSCULAR; INTRAVENOUS; SUBCUTANEOUS at 07:00

## 2018-05-15 RX ADMIN — SODIUM CHLORIDE, PRESERVATIVE FREE SCH ML: 5 INJECTION INTRAVENOUS at 02:48

## 2018-05-15 RX ADMIN — METOCLOPRAMIDE SCH MG: 10 TABLET ORAL at 21:31

## 2018-05-15 RX ADMIN — SODIUM CHLORIDE SCH MLS/HR: 900 INJECTION INTRAVENOUS at 11:53

## 2018-05-16 LAB
ALBUMIN DIAFP-MCNC: 2.9 G/DL (ref 3.2–5.5)
ALBUMIN/GLOB SERPL: 1 {RATIO} (ref 1–2.2)
ALP SERPL-CCNC: 60 IU/L (ref 42–121)
ALT SERPL W P-5'-P-CCNC: 10 IU/L (ref 10–60)
AMYLASE SERPL-CCNC: 306 U/L (ref 28–100)
ANION GAP SERPL CALCULATED.4IONS-SCNC: 7 MMOL/L (ref 6–13)
AST SERPL W P-5'-P-CCNC: 12 IU/L (ref 10–42)
BASOPHILS NFR BLD AUTO: 0 10^3/UL (ref 0–0.1)
BASOPHILS NFR BLD AUTO: 1 %
BILIRUB BLD-MCNC: 0.8 MG/DL (ref 0.2–1)
BUN SERPL-MCNC: 5 MG/DL (ref 6–20)
CALCIUM UR-MCNC: 8.2 MG/DL (ref 8.5–10.3)
CHLORIDE SERPL-SCNC: 107 MMOL/L (ref 101–111)
CO2 SERPL-SCNC: 23 MMOL/L (ref 21–32)
CREAT SERPLBLD-SCNC: 0.5 MG/DL (ref 0.4–1)
CRP SERPL-MCNC: 1.9 MG/DL (ref 0–1)
EOSINOPHIL # BLD AUTO: 0.3 10^3/UL (ref 0–0.7)
EOSINOPHIL NFR BLD AUTO: 5.6 %
ERYTHROCYTE [DISTWIDTH] IN BLOOD BY AUTOMATED COUNT: 25 % (ref 12–15)
GFRSERPLBLD MDRD-ARVRAT: 136 ML/MIN/{1.73_M2} (ref 89–?)
GLOBULIN SER-MCNC: 3 G/DL (ref 2.1–4.2)
GLUCOSE SERPL-MCNC: 95 MG/DL (ref 70–100)
HGB UR QL STRIP: 9.5 G/DL (ref 12–16)
LIPASE SERPL-CCNC: 263 U/L (ref 22–51)
LYMPHOCYTES # SPEC AUTO: 1.7 10^3/UL (ref 1.5–3.5)
LYMPHOCYTES NFR BLD AUTO: 34 %
MCH RBC QN AUTO: 22.2 PG (ref 27–31)
MCHC RBC AUTO-ENTMCNC: 30.6 G/DL (ref 32–36)
MCV RBC AUTO: 72.4 FL (ref 81–99)
MONOCYTES # BLD AUTO: 0.5 10^3/UL (ref 0–1)
MONOCYTES NFR BLD AUTO: 11.3 %
NEUTROPHILS # BLD AUTO: 2.3 10^3/UL (ref 1.5–6.6)
NEUTROPHILS # SNV AUTO: 4.9 X10^3/UL (ref 4.8–10.8)
NEUTROPHILS NFR BLD AUTO: 48.1 %
PDW BLD AUTO: 8.1 FL (ref 7.9–10.8)
PLATELET # BLD: 248 10^3/UL (ref 130–450)
PROT SPEC-MCNC: 5.9 G/DL (ref 6.7–8.2)
RBC MAR: 4.3 10^6/UL (ref 4.2–5.4)
SODIUM SERPLBLD-SCNC: 137 MMOL/L (ref 135–145)

## 2018-05-16 RX ADMIN — POLYETHYLENE GLYCOL 3350 SCH: 17 POWDER, FOR SOLUTION ORAL at 08:32

## 2018-05-16 RX ADMIN — HYDROMORPHONE HYDROCHLORIDE PRN MG: 1 INJECTION, SOLUTION INTRAMUSCULAR; INTRAVENOUS; SUBCUTANEOUS at 03:38

## 2018-05-16 RX ADMIN — HYDROMORPHONE HYDROCHLORIDE PRN MG: 1 INJECTION, SOLUTION INTRAMUSCULAR; INTRAVENOUS; SUBCUTANEOUS at 20:41

## 2018-05-16 RX ADMIN — METOCLOPRAMIDE SCH MG: 10 TABLET ORAL at 10:33

## 2018-05-16 RX ADMIN — HYDROMORPHONE HYDROCHLORIDE PRN MG: 1 INJECTION, SOLUTION INTRAMUSCULAR; INTRAVENOUS; SUBCUTANEOUS at 10:19

## 2018-05-16 RX ADMIN — FERROUS SULFATE TAB 325 MG (65 MG ELEMENTAL FE) SCH MG: 325 (65 FE) TAB at 10:33

## 2018-05-16 RX ADMIN — SODIUM CHLORIDE SCH MLS/HR: 900 INJECTION INTRAVENOUS at 14:22

## 2018-05-16 RX ADMIN — Medication SCH MG: at 18:21

## 2018-05-16 RX ADMIN — SODIUM CHLORIDE SCH MLS/HR: 900 INJECTION INTRAVENOUS at 21:34

## 2018-05-16 RX ADMIN — SODIUM CHLORIDE SCH MLS/HR: 900 INJECTION INTRAVENOUS at 06:06

## 2018-05-16 RX ADMIN — SODIUM CHLORIDE, PRESERVATIVE FREE PRN ML: 5 INJECTION INTRAVENOUS at 03:39

## 2018-05-16 RX ADMIN — MORPHINE SULFATE SCH MG: 15 TABLET ORAL at 16:22

## 2018-05-16 RX ADMIN — MORPHINE SULFATE SCH MG: 15 TABLET ORAL at 04:28

## 2018-05-16 RX ADMIN — HYDROMORPHONE HYDROCHLORIDE PRN MG: 1 INJECTION, SOLUTION INTRAMUSCULAR; INTRAVENOUS; SUBCUTANEOUS at 16:22

## 2018-05-16 RX ADMIN — HYDROMORPHONE HYDROCHLORIDE PRN MG: 1 INJECTION, SOLUTION INTRAMUSCULAR; INTRAVENOUS; SUBCUTANEOUS at 12:14

## 2018-05-16 RX ADMIN — ZOLPIDEM TARTRATE SCH MG: 5 TABLET, FILM COATED ORAL at 20:41

## 2018-05-16 RX ADMIN — SODIUM CHLORIDE SCH MLS/HR: 9 INJECTION, SOLUTION INTRAVENOUS at 10:21

## 2018-05-16 RX ADMIN — METOCLOPRAMIDE SCH MG: 10 TABLET ORAL at 16:22

## 2018-05-16 RX ADMIN — HYDROMORPHONE HYDROCHLORIDE PRN MG: 1 INJECTION, SOLUTION INTRAMUSCULAR; INTRAVENOUS; SUBCUTANEOUS at 22:38

## 2018-05-16 RX ADMIN — HYDROMORPHONE HYDROCHLORIDE PRN MG: 1 INJECTION, SOLUTION INTRAMUSCULAR; INTRAVENOUS; SUBCUTANEOUS at 14:22

## 2018-05-16 RX ADMIN — MORPHINE SULFATE SCH MG: 15 TABLET ORAL at 10:33

## 2018-05-16 RX ADMIN — HYDROMORPHONE HYDROCHLORIDE PRN MG: 1 INJECTION, SOLUTION INTRAMUSCULAR; INTRAVENOUS; SUBCUTANEOUS at 07:45

## 2018-05-16 RX ADMIN — HYDROMORPHONE HYDROCHLORIDE PRN MG: 1 INJECTION, SOLUTION INTRAMUSCULAR; INTRAVENOUS; SUBCUTANEOUS at 18:21

## 2018-05-16 RX ADMIN — SODIUM CHLORIDE AND POTASSIUM CHLORIDE SCH MLS/HR: 9; 1.49 INJECTION, SOLUTION INTRAVENOUS at 02:29

## 2018-05-16 RX ADMIN — SODIUM CHLORIDE SCH MLS/HR: 9 INJECTION, SOLUTION INTRAVENOUS at 21:34

## 2018-05-16 RX ADMIN — SODIUM CHLORIDE, PRESERVATIVE FREE SCH: 5 INJECTION INTRAVENOUS at 08:32

## 2018-05-16 RX ADMIN — SODIUM CHLORIDE SCH MLS/HR: 9 INJECTION, SOLUTION INTRAVENOUS at 14:45

## 2018-05-16 RX ADMIN — METOCLOPRAMIDE SCH MG: 10 TABLET ORAL at 22:33

## 2018-05-16 RX ADMIN — MORPHINE SULFATE SCH MG: 15 TABLET ORAL at 22:33

## 2018-05-16 RX ADMIN — SODIUM CHLORIDE, PRESERVATIVE FREE SCH: 5 INJECTION INTRAVENOUS at 17:39

## 2018-05-16 RX ADMIN — METOCLOPRAMIDE SCH MG: 10 TABLET ORAL at 14:22

## 2018-05-17 LAB
ALBUMIN DIAFP-MCNC: 2.8 G/DL (ref 3.2–5.5)
ALBUMIN/GLOB SERPL: 1 {RATIO} (ref 1–2.2)
ALP SERPL-CCNC: 55 IU/L (ref 42–121)
ALT SERPL W P-5'-P-CCNC: < 10 IU/L (ref 10–60)
AMYLASE SERPL-CCNC: 329 U/L (ref 28–100)
ANION GAP SERPL CALCULATED.4IONS-SCNC: 6 MMOL/L (ref 6–13)
AST SERPL W P-5'-P-CCNC: 14 IU/L (ref 10–42)
BASOPHILS NFR BLD AUTO: 0 10^3/UL (ref 0–0.1)
BASOPHILS NFR BLD AUTO: 0.7 %
BILIRUB BLD-MCNC: 0.7 MG/DL (ref 0.2–1)
BUN SERPL-MCNC: < 5 MG/DL (ref 6–20)
CALCIUM UR-MCNC: 8 MG/DL (ref 8.5–10.3)
CHLORIDE SERPL-SCNC: 108 MMOL/L (ref 101–111)
CO2 SERPL-SCNC: 24 MMOL/L (ref 21–32)
CREAT SERPLBLD-SCNC: 0.5 MG/DL (ref 0.4–1)
EOSINOPHIL # BLD AUTO: 0.3 10^3/UL (ref 0–0.7)
EOSINOPHIL NFR BLD AUTO: 6.4 %
ERYTHROCYTE [DISTWIDTH] IN BLOOD BY AUTOMATED COUNT: 24.5 % (ref 12–15)
FERRITIN SERPL-MCNC: 19.7 NG/ML (ref 11–306.8)
GFRSERPLBLD MDRD-ARVRAT: 136 ML/MIN/{1.73_M2} (ref 89–?)
GLOBULIN SER-MCNC: 2.7 G/DL (ref 2.1–4.2)
GLUCOSE SERPL-MCNC: 83 MG/DL (ref 70–100)
HGB UR QL STRIP: 9 G/DL (ref 12–16)
IRON SATN MFR SERPL: 3 % (ref 20–50)
IRON SERPL-MCNC: 14 UG/DL (ref 28–170)
LIPASE SERPL-CCNC: 215 U/L (ref 22–51)
LYMPHOCYTES # SPEC AUTO: 2.2 10^3/UL (ref 1.5–3.5)
LYMPHOCYTES NFR BLD AUTO: 42.4 %
MAGNESIUM SERPL-MCNC: 1.9 MG/DL (ref 1.7–2.8)
MCH RBC QN AUTO: 22.2 PG (ref 27–31)
MCHC RBC AUTO-ENTMCNC: 31 G/DL (ref 32–36)
MCV RBC AUTO: 71.6 FL (ref 81–99)
MEAN RETIC VALUE: 131.3
MONOCYTES # BLD AUTO: 0.5 10^3/UL (ref 0–1)
MONOCYTES NFR BLD AUTO: 9.2 %
NEUTROPHILS # BLD AUTO: 2.1 10^3/UL (ref 1.5–6.6)
NEUTROPHILS # SNV AUTO: 5.1 X10^3/UL (ref 4.8–10.8)
NEUTROPHILS NFR BLD AUTO: 41.3 %
PDW BLD AUTO: 7.4 FL (ref 7.9–10.8)
PLATELET # BLD: 289 10^3/UL (ref 130–450)
PROT SPEC-MCNC: 5.5 G/DL (ref 6.7–8.2)
RBC MAR: 4.06 10^6/UL (ref 4.2–5.4)
RBC MAR: 4.68 10^6/UL (ref 4.2–5.4)
SODIUM SERPLBLD-SCNC: 138 MMOL/L (ref 135–145)
TIBC SERPL-MCNC: 458 UG/DL (ref 250–450)
TRANSFERRIN SERPL-MCNC: 327 MG/DL (ref 192–382)
VIT B12 SERPL-MCNC: 322 PG/ML (ref 180–914)

## 2018-05-17 RX ADMIN — HYDROMORPHONE HYDROCHLORIDE PRN MG: 1 INJECTION, SOLUTION INTRAMUSCULAR; INTRAVENOUS; SUBCUTANEOUS at 14:55

## 2018-05-17 RX ADMIN — HYDROMORPHONE HYDROCHLORIDE PRN MG: 1 INJECTION, SOLUTION INTRAMUSCULAR; INTRAVENOUS; SUBCUTANEOUS at 19:36

## 2018-05-17 RX ADMIN — METOCLOPRAMIDE SCH MG: 10 TABLET ORAL at 22:04

## 2018-05-17 RX ADMIN — SODIUM CHLORIDE, PRESERVATIVE FREE PRN ML: 5 INJECTION INTRAVENOUS at 04:15

## 2018-05-17 RX ADMIN — OXYCODONE PRN MG: 5 TABLET ORAL at 12:53

## 2018-05-17 RX ADMIN — SODIUM CHLORIDE SCH MLS/HR: 900 INJECTION INTRAVENOUS at 05:55

## 2018-05-17 RX ADMIN — HYDROMORPHONE HYDROCHLORIDE PRN MG: 1 INJECTION, SOLUTION INTRAMUSCULAR; INTRAVENOUS; SUBCUTANEOUS at 04:15

## 2018-05-17 RX ADMIN — HYDROMORPHONE HYDROCHLORIDE PRN MG: 1 INJECTION, SOLUTION INTRAMUSCULAR; INTRAVENOUS; SUBCUTANEOUS at 22:05

## 2018-05-17 RX ADMIN — HYDROMORPHONE HYDROCHLORIDE PRN MG: 1 INJECTION, SOLUTION INTRAMUSCULAR; INTRAVENOUS; SUBCUTANEOUS at 06:46

## 2018-05-17 RX ADMIN — SODIUM CHLORIDE, PRESERVATIVE FREE SCH ML: 5 INJECTION INTRAVENOUS at 00:28

## 2018-05-17 RX ADMIN — METOCLOPRAMIDE SCH MG: 10 TABLET ORAL at 17:04

## 2018-05-17 RX ADMIN — FERROUS SULFATE TAB 325 MG (65 MG ELEMENTAL FE) SCH MG: 325 (65 FE) TAB at 08:47

## 2018-05-17 RX ADMIN — METOCLOPRAMIDE SCH MG: 10 TABLET ORAL at 12:55

## 2018-05-17 RX ADMIN — SODIUM CHLORIDE SCH MLS/HR: 9 INJECTION, SOLUTION INTRAVENOUS at 15:29

## 2018-05-17 RX ADMIN — OXYCODONE PRN MG: 5 TABLET ORAL at 19:36

## 2018-05-17 RX ADMIN — POLYETHYLENE GLYCOL 3350 SCH: 17 POWDER, FOR SOLUTION ORAL at 08:48

## 2018-05-17 RX ADMIN — SODIUM CHLORIDE, PRESERVATIVE FREE PRN ML: 5 INJECTION INTRAVENOUS at 00:37

## 2018-05-17 RX ADMIN — HYDROMORPHONE HYDROCHLORIDE PRN MG: 1 INJECTION, SOLUTION INTRAMUSCULAR; INTRAVENOUS; SUBCUTANEOUS at 00:36

## 2018-05-17 RX ADMIN — HYDROMORPHONE HYDROCHLORIDE PRN MG: 1 INJECTION, SOLUTION INTRAMUSCULAR; INTRAVENOUS; SUBCUTANEOUS at 00:27

## 2018-05-17 RX ADMIN — Medication SCH MG: at 17:03

## 2018-05-17 RX ADMIN — SODIUM CHLORIDE, PRESERVATIVE FREE PRN ML: 5 INJECTION INTRAVENOUS at 02:33

## 2018-05-17 RX ADMIN — SODIUM CHLORIDE SCH MLS/HR: 9 INJECTION, SOLUTION INTRAVENOUS at 05:53

## 2018-05-17 RX ADMIN — HYDROMORPHONE HYDROCHLORIDE PRN MG: 1 INJECTION, SOLUTION INTRAMUSCULAR; INTRAVENOUS; SUBCUTANEOUS at 08:49

## 2018-05-17 RX ADMIN — ZOLPIDEM TARTRATE SCH MG: 5 TABLET, FILM COATED ORAL at 22:04

## 2018-05-17 RX ADMIN — Medication SCH MG: at 08:47

## 2018-05-17 RX ADMIN — HYDROMORPHONE HYDROCHLORIDE PRN MG: 1 INJECTION, SOLUTION INTRAMUSCULAR; INTRAVENOUS; SUBCUTANEOUS at 17:04

## 2018-05-17 RX ADMIN — SODIUM CHLORIDE SCH: 9 INJECTION, SOLUTION INTRAVENOUS at 15:41

## 2018-05-17 RX ADMIN — OXYCODONE PRN MG: 5 TABLET ORAL at 08:47

## 2018-05-17 RX ADMIN — HYDROMORPHONE HYDROCHLORIDE PRN MG: 1 INJECTION, SOLUTION INTRAMUSCULAR; INTRAVENOUS; SUBCUTANEOUS at 12:52

## 2018-05-17 RX ADMIN — SODIUM CHLORIDE, PRESERVATIVE FREE SCH: 5 INJECTION INTRAVENOUS at 18:11

## 2018-05-17 RX ADMIN — HYDROMORPHONE HYDROCHLORIDE PRN MG: 1 INJECTION, SOLUTION INTRAMUSCULAR; INTRAVENOUS; SUBCUTANEOUS at 10:52

## 2018-05-17 RX ADMIN — METOCLOPRAMIDE SCH MG: 10 TABLET ORAL at 08:47

## 2018-05-17 RX ADMIN — SODIUM CHLORIDE SCH MLS/HR: 9 INJECTION, SOLUTION INTRAVENOUS at 22:11

## 2018-05-17 RX ADMIN — CYANOCOBALAMIN TAB 500 MCG SCH MCG: 500 TAB at 17:04

## 2018-05-17 RX ADMIN — MORPHINE SULFATE SCH MG: 15 TABLET ORAL at 22:04

## 2018-05-17 RX ADMIN — SODIUM CHLORIDE, PRESERVATIVE FREE SCH: 5 INJECTION INTRAVENOUS at 07:40

## 2018-05-17 RX ADMIN — ONDANSETRON PRN MG: 2 INJECTION INTRAMUSCULAR; INTRAVENOUS at 02:33

## 2018-05-17 RX ADMIN — SODIUM CHLORIDE SCH MLS/HR: 900 INJECTION INTRAVENOUS at 22:05

## 2018-05-17 RX ADMIN — HYDROMORPHONE HYDROCHLORIDE PRN MG: 1 INJECTION, SOLUTION INTRAMUSCULAR; INTRAVENOUS; SUBCUTANEOUS at 02:35

## 2018-05-17 RX ADMIN — MORPHINE SULFATE SCH MG: 15 TABLET ORAL at 10:51

## 2018-05-17 RX ADMIN — MORPHINE SULFATE SCH MG: 15 TABLET ORAL at 17:03

## 2018-05-17 RX ADMIN — MORPHINE SULFATE SCH MG: 15 TABLET ORAL at 04:21

## 2018-05-17 RX ADMIN — SODIUM CHLORIDE SCH MLS/HR: 900 INJECTION INTRAVENOUS at 12:57

## 2018-05-18 LAB
ALBUMIN DIAFP-MCNC: 2.8 G/DL (ref 3.2–5.5)
ALBUMIN/GLOB SERPL: 1 {RATIO} (ref 1–2.2)
ALP SERPL-CCNC: 51 IU/L (ref 42–121)
ALT SERPL W P-5'-P-CCNC: 10 IU/L (ref 10–60)
AMYLASE SERPL-CCNC: 293 U/L (ref 28–100)
ANION GAP SERPL CALCULATED.4IONS-SCNC: 6 MMOL/L (ref 6–13)
AST SERPL W P-5'-P-CCNC: 15 IU/L (ref 10–42)
BASOPHILS NFR BLD AUTO: 0.1 10^3/UL (ref 0–0.1)
BASOPHILS NFR BLD AUTO: 1.3 %
BILIRUB BLD-MCNC: 0.7 MG/DL (ref 0.2–1)
BUN SERPL-MCNC: 5 MG/DL (ref 6–20)
CALCIUM UR-MCNC: 8 MG/DL (ref 8.5–10.3)
CHLORIDE SERPL-SCNC: 108 MMOL/L (ref 101–111)
CO2 SERPL-SCNC: 25 MMOL/L (ref 21–32)
CREAT SERPLBLD-SCNC: 0.6 MG/DL (ref 0.4–1)
EOSINOPHIL # BLD AUTO: 0.3 10^3/UL (ref 0–0.7)
EOSINOPHIL NFR BLD AUTO: 6.4 %
ERYTHROCYTE [DISTWIDTH] IN BLOOD BY AUTOMATED COUNT: 24.4 % (ref 12–15)
GFRSERPLBLD MDRD-ARVRAT: 110 ML/MIN/{1.73_M2} (ref 89–?)
GLOBULIN SER-MCNC: 2.7 G/DL (ref 2.1–4.2)
GLUCOSE SERPL-MCNC: 89 MG/DL (ref 70–100)
HGB UR QL STRIP: 9.2 G/DL (ref 12–16)
LIPASE SERPL-CCNC: 170 U/L (ref 22–51)
LYMPHOCYTES # SPEC AUTO: 2.2 10^3/UL (ref 1.5–3.5)
LYMPHOCYTES NFR BLD AUTO: 41 %
MCH RBC QN AUTO: 21.7 PG (ref 27–31)
MCHC RBC AUTO-ENTMCNC: 30.2 G/DL (ref 32–36)
MCV RBC AUTO: 72 FL (ref 81–99)
MONOCYTES # BLD AUTO: 0.5 10^3/UL (ref 0–1)
MONOCYTES NFR BLD AUTO: 10.2 %
NEUTROPHILS # BLD AUTO: 2.2 10^3/UL (ref 1.5–6.6)
NEUTROPHILS # SNV AUTO: 5.4 X10^3/UL (ref 4.8–10.8)
NEUTROPHILS NFR BLD AUTO: 41.1 %
PDW BLD AUTO: 7 FL (ref 7.9–10.8)
PH BLDV: 7.43 [PH] (ref 7.31–7.41)
PLATELET # BLD: 359 10^3/UL (ref 130–450)
PROT SPEC-MCNC: 5.5 G/DL (ref 6.7–8.2)
RBC MAR: 4.21 10^6/UL (ref 4.2–5.4)
SODIUM SERPLBLD-SCNC: 139 MMOL/L (ref 135–145)

## 2018-05-18 RX ADMIN — MORPHINE SULFATE SCH MG: 15 TABLET ORAL at 10:51

## 2018-05-18 RX ADMIN — POLYETHYLENE GLYCOL 3350 SCH: 17 POWDER, FOR SOLUTION ORAL at 08:37

## 2018-05-18 RX ADMIN — METOCLOPRAMIDE SCH MG: 10 TABLET ORAL at 16:27

## 2018-05-18 RX ADMIN — FERROUS SULFATE TAB 325 MG (65 MG ELEMENTAL FE) SCH MG: 325 (65 FE) TAB at 08:28

## 2018-05-18 RX ADMIN — SODIUM CHLORIDE SCH MLS/HR: 900 INJECTION INTRAVENOUS at 14:48

## 2018-05-18 RX ADMIN — METOCLOPRAMIDE SCH MG: 10 TABLET ORAL at 08:28

## 2018-05-18 RX ADMIN — MORPHINE SULFATE SCH MG: 15 TABLET ORAL at 04:11

## 2018-05-18 RX ADMIN — SODIUM CHLORIDE, PRESERVATIVE FREE SCH ML: 5 INJECTION INTRAVENOUS at 16:26

## 2018-05-18 RX ADMIN — ZOLPIDEM TARTRATE SCH MG: 5 TABLET, FILM COATED ORAL at 22:09

## 2018-05-18 RX ADMIN — SODIUM CHLORIDE, PRESERVATIVE FREE SCH ML: 5 INJECTION INTRAVENOUS at 01:33

## 2018-05-18 RX ADMIN — HYDROMORPHONE HYDROCHLORIDE PRN MG: 1 INJECTION, SOLUTION INTRAMUSCULAR; INTRAVENOUS; SUBCUTANEOUS at 14:50

## 2018-05-18 RX ADMIN — HYDROMORPHONE HYDROCHLORIDE PRN MG: 1 INJECTION, SOLUTION INTRAMUSCULAR; INTRAVENOUS; SUBCUTANEOUS at 22:16

## 2018-05-18 RX ADMIN — OXYCODONE PRN MG: 5 TABLET ORAL at 01:33

## 2018-05-18 RX ADMIN — SODIUM CHLORIDE, PRESERVATIVE FREE PRN ML: 5 INJECTION INTRAVENOUS at 19:42

## 2018-05-18 RX ADMIN — HYDROMORPHONE HYDROCHLORIDE PRN MG: 1 INJECTION, SOLUTION INTRAMUSCULAR; INTRAVENOUS; SUBCUTANEOUS at 06:38

## 2018-05-18 RX ADMIN — SODIUM CHLORIDE, PRESERVATIVE FREE SCH ML: 5 INJECTION INTRAVENOUS at 14:38

## 2018-05-18 RX ADMIN — OXYCODONE PRN MG: 5 TABLET ORAL at 08:29

## 2018-05-18 RX ADMIN — MORPHINE SULFATE SCH MG: 15 TABLET ORAL at 16:26

## 2018-05-18 RX ADMIN — SODIUM CHLORIDE SCH MLS/HR: 9 INJECTION, SOLUTION INTRAVENOUS at 06:46

## 2018-05-18 RX ADMIN — HYDROMORPHONE HYDROCHLORIDE PRN MG: 1 INJECTION, SOLUTION INTRAMUSCULAR; INTRAVENOUS; SUBCUTANEOUS at 17:11

## 2018-05-18 RX ADMIN — HYDROMORPHONE HYDROCHLORIDE PRN MG: 1 INJECTION, SOLUTION INTRAMUSCULAR; INTRAVENOUS; SUBCUTANEOUS at 10:49

## 2018-05-18 RX ADMIN — MORPHINE SULFATE SCH MG: 15 TABLET ORAL at 22:09

## 2018-05-18 RX ADMIN — METOCLOPRAMIDE SCH MG: 10 TABLET ORAL at 14:35

## 2018-05-18 RX ADMIN — HYDROMORPHONE HYDROCHLORIDE PRN MG: 1 INJECTION, SOLUTION INTRAMUSCULAR; INTRAVENOUS; SUBCUTANEOUS at 08:35

## 2018-05-18 RX ADMIN — Medication SCH MG: at 16:27

## 2018-05-18 RX ADMIN — SODIUM CHLORIDE SCH MLS/HR: 9 INJECTION, SOLUTION INTRAVENOUS at 22:49

## 2018-05-18 RX ADMIN — SODIUM CHLORIDE SCH MLS/HR: 900 INJECTION INTRAVENOUS at 05:42

## 2018-05-18 RX ADMIN — ONDANSETRON PRN MG: 2 INJECTION INTRAMUSCULAR; INTRAVENOUS at 01:38

## 2018-05-18 RX ADMIN — SODIUM CHLORIDE SCH MLS/HR: 900 INJECTION INTRAVENOUS at 22:11

## 2018-05-18 RX ADMIN — CYANOCOBALAMIN TAB 500 MCG SCH MCG: 500 TAB at 08:28

## 2018-05-18 RX ADMIN — SODIUM CHLORIDE SCH MLS/HR: 9 INJECTION, SOLUTION INTRAVENOUS at 16:26

## 2018-05-18 RX ADMIN — HYDROMORPHONE HYDROCHLORIDE PRN MG: 1 INJECTION, SOLUTION INTRAMUSCULAR; INTRAVENOUS; SUBCUTANEOUS at 04:11

## 2018-05-18 RX ADMIN — SODIUM CHLORIDE, PRESERVATIVE FREE PRN ML: 5 INJECTION INTRAVENOUS at 01:39

## 2018-05-18 RX ADMIN — HYDROMORPHONE HYDROCHLORIDE PRN MG: 1 INJECTION, SOLUTION INTRAMUSCULAR; INTRAVENOUS; SUBCUTANEOUS at 12:55

## 2018-05-18 RX ADMIN — HYDROMORPHONE HYDROCHLORIDE PRN MG: 1 INJECTION, SOLUTION INTRAMUSCULAR; INTRAVENOUS; SUBCUTANEOUS at 17:22

## 2018-05-18 RX ADMIN — Medication SCH MG: at 08:29

## 2018-05-18 RX ADMIN — HYDROMORPHONE HYDROCHLORIDE PRN MG: 1 INJECTION, SOLUTION INTRAMUSCULAR; INTRAVENOUS; SUBCUTANEOUS at 19:42

## 2018-05-18 RX ADMIN — OXYCODONE PRN MG: 5 TABLET ORAL at 14:53

## 2018-05-18 RX ADMIN — SODIUM CHLORIDE, PRESERVATIVE FREE PRN ML: 5 INJECTION INTRAVENOUS at 04:11

## 2018-05-18 RX ADMIN — SODIUM CHLORIDE, PRESERVATIVE FREE PRN ML: 5 INJECTION INTRAVENOUS at 06:39

## 2018-05-18 RX ADMIN — HYDROMORPHONE HYDROCHLORIDE PRN MG: 1 INJECTION, SOLUTION INTRAMUSCULAR; INTRAVENOUS; SUBCUTANEOUS at 01:32

## 2018-05-18 RX ADMIN — METOCLOPRAMIDE SCH MG: 10 TABLET ORAL at 22:09

## 2018-05-19 VITALS — SYSTOLIC BLOOD PRESSURE: 113 MMHG | DIASTOLIC BLOOD PRESSURE: 74 MMHG

## 2018-05-19 LAB
ALBUMIN DIAFP-MCNC: 2.9 G/DL (ref 3.2–5.5)
ALBUMIN/GLOB SERPL: 1 {RATIO} (ref 1–2.2)
ALP SERPL-CCNC: 52 IU/L (ref 42–121)
ALT SERPL W P-5'-P-CCNC: 10 IU/L (ref 10–60)
AMYLASE SERPL-CCNC: 236 U/L (ref 28–100)
ANION GAP SERPL CALCULATED.4IONS-SCNC: 6 MMOL/L (ref 6–13)
AST SERPL W P-5'-P-CCNC: 18 IU/L (ref 10–42)
BASOPHILS NFR BLD AUTO: 0.1 10^3/UL (ref 0–0.1)
BASOPHILS NFR BLD AUTO: 1.8 %
BILIRUB BLD-MCNC: 0.7 MG/DL (ref 0.2–1)
BUN SERPL-MCNC: < 5 MG/DL (ref 6–20)
CALCIUM UR-MCNC: 8.1 MG/DL (ref 8.5–10.3)
CHLORIDE SERPL-SCNC: 110 MMOL/L (ref 101–111)
CO2 SERPL-SCNC: 24 MMOL/L (ref 21–32)
CREAT SERPLBLD-SCNC: 0.5 MG/DL (ref 0.4–1)
EOSINOPHIL # BLD AUTO: 0.3 10^3/UL (ref 0–0.7)
EOSINOPHIL NFR BLD AUTO: 6 %
ERYTHROCYTE [DISTWIDTH] IN BLOOD BY AUTOMATED COUNT: 25 % (ref 12–15)
GFRSERPLBLD MDRD-ARVRAT: 136 ML/MIN/{1.73_M2} (ref 89–?)
GLOBULIN SER-MCNC: 2.9 G/DL (ref 2.1–4.2)
GLUCOSE SERPL-MCNC: 87 MG/DL (ref 70–100)
HGB UR QL STRIP: 9.8 G/DL (ref 12–16)
LIPASE SERPL-CCNC: 136 U/L (ref 22–51)
LYMPHOCYTES # SPEC AUTO: 1.9 10^3/UL (ref 1.5–3.5)
LYMPHOCYTES NFR BLD AUTO: 42.6 %
MCH RBC QN AUTO: 22.1 PG (ref 27–31)
MCHC RBC AUTO-ENTMCNC: 30.5 G/DL (ref 32–36)
MCV RBC AUTO: 72.4 FL (ref 81–99)
MONOCYTES # BLD AUTO: 0.4 10^3/UL (ref 0–1)
MONOCYTES NFR BLD AUTO: 8.8 %
NEUTROPHILS # BLD AUTO: 1.8 10^3/UL (ref 1.5–6.6)
NEUTROPHILS # SNV AUTO: 4.4 X10^3/UL (ref 4.8–10.8)
NEUTROPHILS NFR BLD AUTO: 40.8 %
PDW BLD AUTO: 6.9 FL (ref 7.9–10.8)
PH BLDV: 7.46 [PH] (ref 7.31–7.41)
PLATELET # BLD: 433 10^3/UL (ref 130–450)
PROT SPEC-MCNC: 5.8 G/DL (ref 6.7–8.2)
RBC MAR: 4.43 10^6/UL (ref 4.2–5.4)
SODIUM SERPLBLD-SCNC: 140 MMOL/L (ref 135–145)

## 2018-05-19 RX ADMIN — SODIUM CHLORIDE SCH: 9 INJECTION, SOLUTION INTRAVENOUS at 10:32

## 2018-05-19 RX ADMIN — SODIUM CHLORIDE SCH MLS/HR: 9 INJECTION, SOLUTION INTRAVENOUS at 05:17

## 2018-05-19 RX ADMIN — CYANOCOBALAMIN TAB 500 MCG SCH MCG: 500 TAB at 08:53

## 2018-05-19 RX ADMIN — SODIUM CHLORIDE SCH MLS/HR: 900 INJECTION INTRAVENOUS at 06:05

## 2018-05-19 RX ADMIN — METOCLOPRAMIDE SCH MG: 10 TABLET ORAL at 08:53

## 2018-05-19 RX ADMIN — SODIUM CHLORIDE, PRESERVATIVE FREE SCH ML: 5 INJECTION INTRAVENOUS at 01:02

## 2018-05-19 RX ADMIN — HYDROMORPHONE HYDROCHLORIDE PRN MG: 1 INJECTION, SOLUTION INTRAMUSCULAR; INTRAVENOUS; SUBCUTANEOUS at 08:55

## 2018-05-19 RX ADMIN — POLYETHYLENE GLYCOL 3350 SCH: 17 POWDER, FOR SOLUTION ORAL at 08:53

## 2018-05-19 RX ADMIN — HYDROMORPHONE HYDROCHLORIDE PRN MG: 1 INJECTION, SOLUTION INTRAMUSCULAR; INTRAVENOUS; SUBCUTANEOUS at 03:52

## 2018-05-19 RX ADMIN — MORPHINE SULFATE SCH MG: 15 TABLET ORAL at 10:31

## 2018-05-19 RX ADMIN — SODIUM CHLORIDE, PRESERVATIVE FREE PRN ML: 5 INJECTION INTRAVENOUS at 03:52

## 2018-05-19 RX ADMIN — FERROUS SULFATE TAB 325 MG (65 MG ELEMENTAL FE) SCH MG: 325 (65 FE) TAB at 08:53

## 2018-05-19 RX ADMIN — Medication SCH MG: at 08:53

## 2018-05-19 RX ADMIN — SODIUM CHLORIDE, PRESERVATIVE FREE SCH ML: 5 INJECTION INTRAVENOUS at 08:54

## 2018-05-19 RX ADMIN — HYDROMORPHONE HYDROCHLORIDE PRN MG: 1 INJECTION, SOLUTION INTRAMUSCULAR; INTRAVENOUS; SUBCUTANEOUS at 06:51

## 2018-05-19 RX ADMIN — MORPHINE SULFATE SCH MG: 15 TABLET ORAL at 04:10

## 2018-05-19 RX ADMIN — HYDROMORPHONE HYDROCHLORIDE PRN MG: 1 INJECTION, SOLUTION INTRAMUSCULAR; INTRAVENOUS; SUBCUTANEOUS at 01:02

## 2018-07-09 ENCOUNTER — HOSPITAL ENCOUNTER (EMERGENCY)
Dept: HOSPITAL 76 - ED | Age: 43
Discharge: HOME | End: 2018-07-09
Payer: COMMERCIAL

## 2018-07-09 VITALS — SYSTOLIC BLOOD PRESSURE: 128 MMHG | DIASTOLIC BLOOD PRESSURE: 100 MMHG

## 2018-07-09 DIAGNOSIS — M62.838: Primary | ICD-10-CM

## 2018-07-09 DIAGNOSIS — M79.7: ICD-10-CM

## 2018-07-09 DIAGNOSIS — M54.2: ICD-10-CM

## 2018-07-09 PROCEDURE — 99283 EMERGENCY DEPT VISIT LOW MDM: CPT

## 2018-09-05 ENCOUNTER — HOSPITAL ENCOUNTER (EMERGENCY)
Dept: HOSPITAL 76 - ED | Age: 43
Discharge: HOME | End: 2018-09-05
Payer: COMMERCIAL

## 2018-09-05 VITALS — SYSTOLIC BLOOD PRESSURE: 125 MMHG | DIASTOLIC BLOOD PRESSURE: 83 MMHG

## 2018-09-05 DIAGNOSIS — R10.9: Primary | ICD-10-CM

## 2018-09-05 DIAGNOSIS — G89.29: ICD-10-CM

## 2018-09-05 DIAGNOSIS — R11.2: ICD-10-CM

## 2018-09-05 LAB
ALBUMIN DIAFP-MCNC: 4.7 G/DL (ref 3.2–5.5)
ALBUMIN/GLOB SERPL: 1.3 {RATIO} (ref 1–2.2)
ALP SERPL-CCNC: 84 IU/L (ref 42–121)
ALT SERPL W P-5'-P-CCNC: 18 IU/L (ref 10–60)
ANION GAP SERPL CALCULATED.4IONS-SCNC: 8 MMOL/L (ref 6–13)
AST SERPL W P-5'-P-CCNC: 26 IU/L (ref 10–42)
BASOPHILS NFR BLD AUTO: 0 10^3/UL (ref 0–0.1)
BASOPHILS NFR BLD AUTO: 0.6 %
BILIRUB BLD-MCNC: 1.2 MG/DL (ref 0.2–1)
BUN SERPL-MCNC: 10 MG/DL (ref 6–20)
CALCIUM UR-MCNC: 9.3 MG/DL (ref 8.5–10.3)
CHLORIDE SERPL-SCNC: 102 MMOL/L (ref 101–111)
CO2 SERPL-SCNC: 26 MMOL/L (ref 21–32)
CREAT SERPLBLD-SCNC: 0.6 MG/DL (ref 0.4–1)
EOSINOPHIL # BLD AUTO: 0.2 10^3/UL (ref 0–0.7)
EOSINOPHIL NFR BLD AUTO: 2.6 %
ERYTHROCYTE [DISTWIDTH] IN BLOOD BY AUTOMATED COUNT: 18.8 % (ref 12–15)
GFRSERPLBLD MDRD-ARVRAT: 110 ML/MIN/{1.73_M2} (ref 89–?)
GLOBULIN SER-MCNC: 3.5 G/DL (ref 2.1–4.2)
GLUCOSE SERPL-MCNC: 94 MG/DL (ref 70–100)
HGB UR QL STRIP: 12.9 G/DL (ref 12–16)
LIPASE SERPL-CCNC: 60 U/L (ref 22–51)
LYMPHOCYTES # SPEC AUTO: 3 10^3/UL (ref 1.5–3.5)
LYMPHOCYTES NFR BLD AUTO: 44.9 %
MAGNESIUM SERPL-MCNC: 2.2 MG/DL (ref 1.7–2.8)
MCH RBC QN AUTO: 27.8 PG (ref 27–31)
MCHC RBC AUTO-ENTMCNC: 33.6 G/DL (ref 32–36)
MCV RBC AUTO: 82.8 FL (ref 81–99)
MONOCYTES # BLD AUTO: 0.4 10^3/UL (ref 0–1)
MONOCYTES NFR BLD AUTO: 5.9 %
NEUTROPHILS # BLD AUTO: 3.1 10^3/UL (ref 1.5–6.6)
NEUTROPHILS # SNV AUTO: 6.8 X10^3/UL (ref 4.8–10.8)
NEUTROPHILS NFR BLD AUTO: 46 %
PDW BLD AUTO: 6.7 FL (ref 7.9–10.8)
PHOSPHATE BLD-MCNC: 5.3 MG/DL (ref 2.5–4.6)
PLATELET # BLD: 309 10^3/UL (ref 130–450)
PROT SPEC-MCNC: 8.2 G/DL (ref 6.7–8.2)
RBC MAR: 4.64 10^6/UL (ref 4.2–5.4)
SODIUM SERPLBLD-SCNC: 136 MMOL/L (ref 135–145)

## 2018-09-05 PROCEDURE — 83690 ASSAY OF LIPASE: CPT

## 2018-09-05 PROCEDURE — 96365 THER/PROPH/DIAG IV INF INIT: CPT

## 2018-09-05 PROCEDURE — 80053 COMPREHEN METABOLIC PANEL: CPT

## 2018-09-05 PROCEDURE — 84100 ASSAY OF PHOSPHORUS: CPT

## 2018-09-05 PROCEDURE — 96368 THER/DIAG CONCURRENT INF: CPT

## 2018-09-05 PROCEDURE — 85025 COMPLETE CBC W/AUTO DIFF WBC: CPT

## 2018-09-05 PROCEDURE — 99284 EMERGENCY DEPT VISIT MOD MDM: CPT

## 2018-09-05 PROCEDURE — 83735 ASSAY OF MAGNESIUM: CPT

## 2018-09-05 PROCEDURE — 99283 EMERGENCY DEPT VISIT LOW MDM: CPT

## 2018-09-05 PROCEDURE — 36415 COLL VENOUS BLD VENIPUNCTURE: CPT

## 2018-10-31 ENCOUNTER — HOSPITAL ENCOUNTER (EMERGENCY)
Dept: HOSPITAL 76 - ED | Age: 43
Discharge: HOME | End: 2018-10-31
Payer: COMMERCIAL

## 2018-10-31 VITALS — DIASTOLIC BLOOD PRESSURE: 102 MMHG | SYSTOLIC BLOOD PRESSURE: 138 MMHG

## 2018-10-31 DIAGNOSIS — K02.9: Primary | ICD-10-CM

## 2018-10-31 DIAGNOSIS — K08.89: ICD-10-CM

## 2018-10-31 PROCEDURE — 99283 EMERGENCY DEPT VISIT LOW MDM: CPT

## 2018-10-31 PROCEDURE — 64400 NJX AA&/STRD TRIGEMINAL NRV: CPT

## 2019-05-08 ENCOUNTER — HOSPITAL ENCOUNTER (EMERGENCY)
Dept: HOSPITAL 76 - ED | Age: 44
LOS: 1 days | Discharge: HOME | End: 2019-05-09
Payer: COMMERCIAL

## 2019-05-08 VITALS — DIASTOLIC BLOOD PRESSURE: 84 MMHG | SYSTOLIC BLOOD PRESSURE: 115 MMHG
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
## 2019-05-08 DIAGNOSIS — S05.11XA: ICD-10-CM
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
## 2019-05-08 DIAGNOSIS — E86.0: ICD-10-CM

## 2019-05-08 DIAGNOSIS — G43.909: Primary | ICD-10-CM

## 2019-05-08 PROCEDURE — 96376 TX/PRO/DX INJ SAME DRUG ADON: CPT

## 2019-05-08 PROCEDURE — 70480 CT ORBIT/EAR/FOSSA W/O DYE: CPT

## 2019-05-08 PROCEDURE — 96375 TX/PRO/DX INJ NEW DRUG ADDON: CPT

## 2019-05-08 PROCEDURE — 99283 EMERGENCY DEPT VISIT LOW MDM: CPT

## 2019-05-08 PROCEDURE — 96374 THER/PROPH/DIAG INJ IV PUSH: CPT

## 2019-05-08 PROCEDURE — 80053 COMPREHEN METABOLIC PANEL: CPT

## 2019-05-08 PROCEDURE — 83735 ASSAY OF MAGNESIUM: CPT

## 2019-05-08 PROCEDURE — 96361 HYDRATE IV INFUSION ADD-ON: CPT

## 2019-05-08 PROCEDURE — 83690 ASSAY OF LIPASE: CPT

## 2019-05-08 PROCEDURE — 70450 CT HEAD/BRAIN W/O DYE: CPT

## 2019-05-08 PROCEDURE — 99284 EMERGENCY DEPT VISIT MOD MDM: CPT

## 2019-05-19 ENCOUNTER — HOSPITAL ENCOUNTER (EMERGENCY)
Dept: HOSPITAL 76 - ED | Age: 44
Discharge: HOME | End: 2019-05-19
Payer: COMMERCIAL

## 2019-05-19 VITALS — DIASTOLIC BLOOD PRESSURE: 97 MMHG | SYSTOLIC BLOOD PRESSURE: 128 MMHG

## 2019-05-19 DIAGNOSIS — S16.1XXA: ICD-10-CM

## 2019-05-19 DIAGNOSIS — S00.83XA: ICD-10-CM

## 2019-05-19 DIAGNOSIS — S09.90XA: Primary | ICD-10-CM

## 2019-05-19 PROCEDURE — 70450 CT HEAD/BRAIN W/O DYE: CPT

## 2019-05-19 PROCEDURE — 96372 THER/PROPH/DIAG INJ SC/IM: CPT

## 2019-05-19 PROCEDURE — 99283 EMERGENCY DEPT VISIT LOW MDM: CPT

## 2019-05-19 PROCEDURE — 70486 CT MAXILLOFACIAL W/O DYE: CPT

## 2019-05-19 PROCEDURE — 72125 CT NECK SPINE W/O DYE: CPT

## 2019-06-08 ENCOUNTER — HOSPITAL ENCOUNTER (EMERGENCY)
Dept: HOSPITAL 76 - ED | Age: 44
Discharge: HOME | End: 2019-06-08
Payer: COMMERCIAL

## 2019-06-08 VITALS — DIASTOLIC BLOOD PRESSURE: 85 MMHG | SYSTOLIC BLOOD PRESSURE: 124 MMHG

## 2019-06-08 DIAGNOSIS — F17.200: ICD-10-CM

## 2019-06-08 DIAGNOSIS — R10.31: Primary | ICD-10-CM

## 2019-06-08 DIAGNOSIS — Z87.19: ICD-10-CM

## 2019-06-08 DIAGNOSIS — Z93.4: ICD-10-CM

## 2019-06-08 LAB
ALBUMIN DIAFP-MCNC: 4.4 G/DL (ref 3.2–5.5)
ALBUMIN/GLOB SERPL: 1.4 {RATIO} (ref 1–2.2)
ALP SERPL-CCNC: 70 IU/L (ref 42–121)
ALT SERPL W P-5'-P-CCNC: 10 IU/L (ref 10–60)
ANION GAP SERPL CALCULATED.4IONS-SCNC: 13 MMOL/L (ref 6–13)
AST SERPL W P-5'-P-CCNC: 22 IU/L (ref 10–42)
BASOPHILS NFR BLD AUTO: 0 10^3/UL (ref 0–0.1)
BASOPHILS NFR BLD AUTO: 0.5 %
BILIRUB BLD-MCNC: 0.6 MG/DL (ref 0.2–1)
BUN SERPL-MCNC: 9 MG/DL (ref 6–20)
CALCIUM UR-MCNC: 9.1 MG/DL (ref 8.5–10.3)
CHLORIDE SERPL-SCNC: 104 MMOL/L (ref 101–111)
CO2 SERPL-SCNC: 22 MMOL/L (ref 21–32)
CREAT SERPLBLD-SCNC: 0.6 MG/DL (ref 0.4–1)
EOSINOPHIL # BLD AUTO: 0 10^3/UL (ref 0–0.7)
EOSINOPHIL NFR BLD AUTO: 0.5 %
ERYTHROCYTE [DISTWIDTH] IN BLOOD BY AUTOMATED COUNT: 21.5 % (ref 12–15)
GFRSERPLBLD MDRD-ARVRAT: 110 ML/MIN/{1.73_M2} (ref 89–?)
GLOBULIN SER-MCNC: 3.1 G/DL (ref 2.1–4.2)
GLUCOSE SERPL-MCNC: 102 MG/DL (ref 70–100)
HGB UR QL STRIP: 10.4 G/DL (ref 12–16)
LIPASE SERPL-CCNC: 54 U/L (ref 22–51)
LYMPHOCYTES # SPEC AUTO: 1.8 10^3/UL (ref 1.5–3.5)
LYMPHOCYTES NFR BLD AUTO: 29.2 %
MCH RBC QN AUTO: 22.7 PG (ref 27–31)
MCHC RBC AUTO-ENTMCNC: 31.3 G/DL (ref 32–36)
MCV RBC AUTO: 72.4 FL (ref 81–99)
MONOCYTES # BLD AUTO: 0.4 10^3/UL (ref 0–1)
MONOCYTES NFR BLD AUTO: 6.1 %
NEUTROPHILS # BLD AUTO: 3.9 10^3/UL (ref 1.5–6.6)
NEUTROPHILS # SNV AUTO: 6.1 X10^3/UL (ref 4.8–10.8)
NEUTROPHILS NFR BLD AUTO: 63.7 %
PDW BLD AUTO: 6.8 FL (ref 7.9–10.8)
PLATELET # BLD: 374 10^3/UL (ref 130–450)
PROT SPEC-MCNC: 7.5 G/DL (ref 6.7–8.2)
RBC MAR: 4.57 10^6/UL (ref 4.2–5.4)
SODIUM SERPLBLD-SCNC: 139 MMOL/L (ref 135–145)

## 2019-06-08 PROCEDURE — 74177 CT ABD & PELVIS W/CONTRAST: CPT

## 2019-06-08 PROCEDURE — 99283 EMERGENCY DEPT VISIT LOW MDM: CPT

## 2019-06-08 PROCEDURE — 83690 ASSAY OF LIPASE: CPT

## 2019-06-08 PROCEDURE — 85025 COMPLETE CBC W/AUTO DIFF WBC: CPT

## 2019-06-08 PROCEDURE — 96376 TX/PRO/DX INJ SAME DRUG ADON: CPT

## 2019-06-08 PROCEDURE — 96375 TX/PRO/DX INJ NEW DRUG ADDON: CPT

## 2019-06-08 PROCEDURE — 80053 COMPREHEN METABOLIC PANEL: CPT

## 2019-06-08 PROCEDURE — 36415 COLL VENOUS BLD VENIPUNCTURE: CPT

## 2019-06-08 PROCEDURE — 99284 EMERGENCY DEPT VISIT MOD MDM: CPT

## 2019-06-08 PROCEDURE — 96374 THER/PROPH/DIAG INJ IV PUSH: CPT

## 2019-06-22 ENCOUNTER — HOSPITAL ENCOUNTER (EMERGENCY)
Dept: HOSPITAL 76 - ED | Age: 44
Discharge: HOME | End: 2019-06-22
Payer: COMMERCIAL

## 2019-06-22 VITALS — SYSTOLIC BLOOD PRESSURE: 107 MMHG | DIASTOLIC BLOOD PRESSURE: 76 MMHG

## 2019-06-22 DIAGNOSIS — Z87.19: ICD-10-CM

## 2019-06-22 DIAGNOSIS — F17.200: ICD-10-CM

## 2019-06-22 DIAGNOSIS — R19.7: ICD-10-CM

## 2019-06-22 DIAGNOSIS — R11.2: ICD-10-CM

## 2019-06-22 DIAGNOSIS — R10.32: Primary | ICD-10-CM

## 2019-06-22 LAB
ALBUMIN DIAFP-MCNC: 4.1 G/DL (ref 3.2–5.5)
ALBUMIN/GLOB SERPL: 1.2 {RATIO} (ref 1–2.2)
ALP SERPL-CCNC: 66 IU/L (ref 42–121)
ALT SERPL W P-5'-P-CCNC: 13 IU/L (ref 10–60)
ANION GAP SERPL CALCULATED.4IONS-SCNC: 11 MMOL/L (ref 6–13)
AST SERPL W P-5'-P-CCNC: 27 IU/L (ref 10–42)
BASOPHILS NFR BLD AUTO: 0 10^3/UL (ref 0–0.1)
BASOPHILS NFR BLD AUTO: 0.4 %
BILIRUB BLD-MCNC: 1 MG/DL (ref 0.2–1)
BUN SERPL-MCNC: 10 MG/DL (ref 6–20)
CALCIUM UR-MCNC: 9.3 MG/DL (ref 8.5–10.3)
CHLORIDE SERPL-SCNC: 104 MMOL/L (ref 101–111)
CLARITY UR REFRACT.AUTO: CLEAR
CO2 SERPL-SCNC: 23 MMOL/L (ref 21–32)
CREAT SERPLBLD-SCNC: 0.9 MG/DL (ref 0.4–1)
EOSINOPHIL # BLD AUTO: 0.2 10^3/UL (ref 0–0.7)
EOSINOPHIL NFR BLD AUTO: 2.2 %
ERYTHROCYTE [DISTWIDTH] IN BLOOD BY AUTOMATED COUNT: 20.8 % (ref 12–15)
GFRSERPLBLD MDRD-ARVRAT: 69 ML/MIN/{1.73_M2} (ref 89–?)
GLOBULIN SER-MCNC: 3.3 G/DL (ref 2.1–4.2)
GLUCOSE SERPL-MCNC: 103 MG/DL (ref 70–100)
GLUCOSE UR QL STRIP.AUTO: NEGATIVE MG/DL
HGB UR QL STRIP: 9.7 G/DL (ref 12–16)
KETONES UR QL STRIP.AUTO: (no result) MG/DL
LIPASE SERPL-CCNC: 42 U/L (ref 22–51)
LYMPHOCYTES # SPEC AUTO: 3 10^3/UL (ref 1.5–3.5)
LYMPHOCYTES NFR BLD AUTO: 37.1 %
MCH RBC QN AUTO: 21.9 PG (ref 27–31)
MCHC RBC AUTO-ENTMCNC: 28.9 G/DL (ref 32–36)
MCV RBC AUTO: 75.8 FL (ref 81–99)
MONOCYTES # BLD AUTO: 0.7 10^3/UL (ref 0–1)
MONOCYTES NFR BLD AUTO: 8 %
NEUTROPHILS # BLD AUTO: 4.2 10^3/UL (ref 1.5–6.6)
NEUTROPHILS # SNV AUTO: 8.2 X10^3/UL (ref 4.8–10.8)
NEUTROPHILS NFR BLD AUTO: 51.9 %
NITRITE UR QL STRIP.AUTO: NEGATIVE
PDW BLD AUTO: 8 FL (ref 7.9–10.8)
PH UR STRIP.AUTO: 6 PH (ref 5–7.5)
PLAT MORPH BLD: (no result)
PLATELET # BLD: 241 10^3/UL (ref 130–450)
PLATELET BLD QL SMEAR: (no result)
PROT SPEC-MCNC: 7.4 G/DL (ref 6.7–8.2)
PROT UR STRIP.AUTO-MCNC: NEGATIVE MG/DL
RBC # UR STRIP.AUTO: (no result) /UL
RBC MAR: 4.43 10^6/UL (ref 4.2–5.4)
RBC MORPH BLD: (no result)
SODIUM SERPLBLD-SCNC: 138 MMOL/L (ref 135–145)
SP GR UR STRIP.AUTO: 1.01 (ref 1–1.03)
UROBILINOGEN UR QL STRIP.AUTO: (no result) E.U./DL
UROBILINOGEN UR STRIP.AUTO-MCNC: NEGATIVE MG/DL

## 2019-06-22 PROCEDURE — 83690 ASSAY OF LIPASE: CPT

## 2019-06-22 PROCEDURE — 87086 URINE CULTURE/COLONY COUNT: CPT

## 2019-06-22 PROCEDURE — 99283 EMERGENCY DEPT VISIT LOW MDM: CPT

## 2019-06-22 PROCEDURE — 36415 COLL VENOUS BLD VENIPUNCTURE: CPT

## 2019-06-22 PROCEDURE — 81001 URINALYSIS AUTO W/SCOPE: CPT

## 2019-06-22 PROCEDURE — 85025 COMPLETE CBC W/AUTO DIFF WBC: CPT

## 2019-06-22 PROCEDURE — 81003 URINALYSIS AUTO W/O SCOPE: CPT

## 2019-06-22 PROCEDURE — 80053 COMPREHEN METABOLIC PANEL: CPT

## 2019-06-29 ENCOUNTER — HOSPITAL ENCOUNTER (EMERGENCY)
Dept: HOSPITAL 76 - ED | Age: 44
Discharge: HOME | End: 2019-06-29
Payer: COMMERCIAL

## 2019-06-29 VITALS — SYSTOLIC BLOOD PRESSURE: 118 MMHG | DIASTOLIC BLOOD PRESSURE: 73 MMHG

## 2019-06-29 DIAGNOSIS — R07.81: Primary | ICD-10-CM

## 2019-06-29 DIAGNOSIS — F17.200: ICD-10-CM

## 2019-06-29 LAB
ALBUMIN DIAFP-MCNC: 4.1 G/DL (ref 3.2–5.5)
ALBUMIN/GLOB SERPL: 1.2 {RATIO} (ref 1–2.2)
ALP SERPL-CCNC: 73 IU/L (ref 42–121)
ALT SERPL W P-5'-P-CCNC: 13 IU/L (ref 10–60)
ANION GAP SERPL CALCULATED.4IONS-SCNC: 11 MMOL/L (ref 6–13)
AST SERPL W P-5'-P-CCNC: 25 IU/L (ref 10–42)
BASOPHILS NFR BLD AUTO: 0 10^3/UL (ref 0–0.1)
BASOPHILS NFR BLD AUTO: 0.7 %
BILIRUB BLD-MCNC: 1 MG/DL (ref 0.2–1)
BUN SERPL-MCNC: 9 MG/DL (ref 6–20)
CALCIUM UR-MCNC: 9 MG/DL (ref 8.5–10.3)
CHLORIDE SERPL-SCNC: 104 MMOL/L (ref 101–111)
CO2 SERPL-SCNC: 23 MMOL/L (ref 21–32)
CREAT SERPLBLD-SCNC: 0.7 MG/DL (ref 0.4–1)
EOSINOPHIL # BLD AUTO: 0.2 10^3/UL (ref 0–0.7)
EOSINOPHIL NFR BLD AUTO: 4.1 %
ERYTHROCYTE [DISTWIDTH] IN BLOOD BY AUTOMATED COUNT: 20.9 % (ref 12–15)
GFRSERPLBLD MDRD-ARVRAT: 92 ML/MIN/{1.73_M2} (ref 89–?)
GLOBULIN SER-MCNC: 3.5 G/DL (ref 2.1–4.2)
GLUCOSE SERPL-MCNC: 101 MG/DL (ref 70–100)
HGB UR QL STRIP: 10.2 G/DL (ref 12–16)
LIPASE SERPL-CCNC: 38 U/L (ref 22–51)
LYMPHOCYTES # SPEC AUTO: 1.6 10^3/UL (ref 1.5–3.5)
LYMPHOCYTES NFR BLD AUTO: 35.8 %
MCH RBC QN AUTO: 22.3 PG (ref 27–31)
MCHC RBC AUTO-ENTMCNC: 30.1 G/DL (ref 32–36)
MCV RBC AUTO: 74 FL (ref 81–99)
MONOCYTES # BLD AUTO: 0.4 10^3/UL (ref 0–1)
MONOCYTES NFR BLD AUTO: 9.7 %
NEUTROPHILS # BLD AUTO: 2.2 10^3/UL (ref 1.5–6.6)
NEUTROPHILS # SNV AUTO: 4.4 X10^3/UL (ref 4.8–10.8)
NEUTROPHILS NFR BLD AUTO: 49.2 %
PDW BLD AUTO: 8.9 FL (ref 7.9–10.8)
PLAT MORPH BLD: (no result)
PLATELET # BLD: 378 10^3/UL (ref 130–450)
PLATELET BLD QL SMEAR: (no result)
PROT SPEC-MCNC: 7.6 G/DL (ref 6.7–8.2)
RBC MAR: 4.58 10^6/UL (ref 4.2–5.4)
RBC MORPH BLD: (no result)
SODIUM SERPLBLD-SCNC: 138 MMOL/L (ref 135–145)

## 2019-06-29 PROCEDURE — 36415 COLL VENOUS BLD VENIPUNCTURE: CPT

## 2019-06-29 PROCEDURE — 71046 X-RAY EXAM CHEST 2 VIEWS: CPT

## 2019-06-29 PROCEDURE — 96374 THER/PROPH/DIAG INJ IV PUSH: CPT

## 2019-06-29 PROCEDURE — 99284 EMERGENCY DEPT VISIT MOD MDM: CPT

## 2019-06-29 PROCEDURE — 83690 ASSAY OF LIPASE: CPT

## 2019-06-29 PROCEDURE — 96376 TX/PRO/DX INJ SAME DRUG ADON: CPT

## 2019-06-29 PROCEDURE — 80053 COMPREHEN METABOLIC PANEL: CPT

## 2019-06-29 PROCEDURE — 84484 ASSAY OF TROPONIN QUANT: CPT

## 2019-06-29 PROCEDURE — 99283 EMERGENCY DEPT VISIT LOW MDM: CPT

## 2019-06-29 PROCEDURE — 93005 ELECTROCARDIOGRAM TRACING: CPT

## 2019-06-29 PROCEDURE — 85025 COMPLETE CBC W/AUTO DIFF WBC: CPT

## 2019-07-30 ENCOUNTER — HOSPITAL ENCOUNTER (EMERGENCY)
Dept: HOSPITAL 76 - ED | Age: 44
Discharge: HOME | End: 2019-07-30
Payer: COMMERCIAL

## 2019-07-30 VITALS — SYSTOLIC BLOOD PRESSURE: 124 MMHG | DIASTOLIC BLOOD PRESSURE: 84 MMHG

## 2019-07-30 DIAGNOSIS — R11.2: ICD-10-CM

## 2019-07-30 DIAGNOSIS — R10.9: Primary | ICD-10-CM

## 2019-07-30 DIAGNOSIS — F17.200: ICD-10-CM

## 2019-07-30 LAB
ALBUMIN DIAFP-MCNC: 4.2 G/DL (ref 3.2–5.5)
ALBUMIN/GLOB SERPL: 1.3 {RATIO} (ref 1–2.2)
ALP SERPL-CCNC: 64 IU/L (ref 42–121)
ALT SERPL W P-5'-P-CCNC: 14 IU/L (ref 10–60)
ANION GAP SERPL CALCULATED.4IONS-SCNC: 10 MMOL/L (ref 6–13)
AST SERPL W P-5'-P-CCNC: 25 IU/L (ref 10–42)
BASOPHILS NFR BLD AUTO: 0 10^3/UL (ref 0–0.1)
BASOPHILS NFR BLD AUTO: 0.5 %
BILIRUB BLD-MCNC: 0.7 MG/DL (ref 0.2–1)
BUN SERPL-MCNC: 9 MG/DL (ref 6–20)
CALCIUM UR-MCNC: 9.3 MG/DL (ref 8.5–10.3)
CHLORIDE SERPL-SCNC: 104 MMOL/L (ref 101–111)
CLARITY UR REFRACT.AUTO: CLEAR
CO2 SERPL-SCNC: 26 MMOL/L (ref 21–32)
CREAT SERPLBLD-SCNC: 0.9 MG/DL (ref 0.4–1)
EOSINOPHIL # BLD AUTO: 0.1 10^3/UL (ref 0–0.7)
EOSINOPHIL NFR BLD AUTO: 2.1 %
ERYTHROCYTE [DISTWIDTH] IN BLOOD BY AUTOMATED COUNT: 21.1 % (ref 12–15)
GFRSERPLBLD MDRD-ARVRAT: 68 ML/MIN/{1.73_M2} (ref 89–?)
GLOBULIN SER-MCNC: 3.2 G/DL (ref 2.1–4.2)
GLUCOSE SERPL-MCNC: 106 MG/DL (ref 70–100)
GLUCOSE UR QL STRIP.AUTO: NEGATIVE MG/DL
HGB UR QL STRIP: 9.5 G/DL (ref 12–16)
KETONES UR QL STRIP.AUTO: (no result) MG/DL
LIPASE SERPL-CCNC: 33 U/L (ref 22–51)
LYMPHOCYTES # SPEC AUTO: 3.4 10^3/UL (ref 1.5–3.5)
LYMPHOCYTES NFR BLD AUTO: 55.3 %
MCH RBC QN AUTO: 22.1 PG (ref 27–31)
MCHC RBC AUTO-ENTMCNC: 29.6 G/DL (ref 32–36)
MCV RBC AUTO: 74.8 FL (ref 81–99)
MONOCYTES # BLD AUTO: 0.4 10^3/UL (ref 0–1)
MONOCYTES NFR BLD AUTO: 6.8 %
NEUTROPHILS # BLD AUTO: 2.2 10^3/UL (ref 1.5–6.6)
NEUTROPHILS # SNV AUTO: 6.2 X10^3/UL (ref 4.8–10.8)
NEUTROPHILS NFR BLD AUTO: 35.1 %
NITRITE UR QL STRIP.AUTO: NEGATIVE
PDW BLD AUTO: 8.8 FL (ref 7.9–10.8)
PH UR STRIP.AUTO: 5.5 PH (ref 5–7.5)
PLAT MORPH BLD: (no result)
PLATELET # BLD: 300 10^3/UL (ref 130–450)
PLATELET BLD QL SMEAR: (no result)
PROT SPEC-MCNC: 7.4 G/DL (ref 6.7–8.2)
PROT UR STRIP.AUTO-MCNC: NEGATIVE MG/DL
RBC # UR STRIP.AUTO: (no result) /UL
RBC MAR: 4.29 10^6/UL (ref 4.2–5.4)
RBC MORPH BLD: (no result)
SODIUM SERPLBLD-SCNC: 140 MMOL/L (ref 135–145)
SP GR UR STRIP.AUTO: 1.02 (ref 1–1.03)
UROBILINOGEN UR QL STRIP.AUTO: (no result) E.U./DL
UROBILINOGEN UR STRIP.AUTO-MCNC: NEGATIVE MG/DL

## 2019-07-30 PROCEDURE — 99283 EMERGENCY DEPT VISIT LOW MDM: CPT

## 2019-07-30 PROCEDURE — 85025 COMPLETE CBC W/AUTO DIFF WBC: CPT

## 2019-07-30 PROCEDURE — 80053 COMPREHEN METABOLIC PANEL: CPT

## 2019-07-30 PROCEDURE — 83690 ASSAY OF LIPASE: CPT

## 2019-07-30 PROCEDURE — 81001 URINALYSIS AUTO W/SCOPE: CPT

## 2019-07-30 PROCEDURE — 36415 COLL VENOUS BLD VENIPUNCTURE: CPT

## 2019-07-30 PROCEDURE — 81003 URINALYSIS AUTO W/O SCOPE: CPT

## 2019-07-30 PROCEDURE — 87086 URINE CULTURE/COLONY COUNT: CPT

## 2019-07-30 PROCEDURE — 96372 THER/PROPH/DIAG INJ SC/IM: CPT

## 2019-08-15 ENCOUNTER — HOSPITAL ENCOUNTER (EMERGENCY)
Dept: HOSPITAL 76 - ED | Age: 44
LOS: 1 days | Discharge: HOME | End: 2019-08-16
Payer: COMMERCIAL

## 2019-08-15 VITALS — SYSTOLIC BLOOD PRESSURE: 101 MMHG | DIASTOLIC BLOOD PRESSURE: 76 MMHG

## 2019-08-15 DIAGNOSIS — F17.200: ICD-10-CM

## 2019-08-15 DIAGNOSIS — R10.11: Primary | ICD-10-CM

## 2019-08-15 LAB
ALBUMIN DIAFP-MCNC: 3.8 G/DL (ref 3.2–5.5)
ALBUMIN/GLOB SERPL: 1.1 {RATIO} (ref 1–2.2)
ALP SERPL-CCNC: 76 IU/L (ref 42–121)
ALT SERPL W P-5'-P-CCNC: 16 IU/L (ref 10–60)
ANION GAP SERPL CALCULATED.4IONS-SCNC: 12 MMOL/L (ref 6–13)
AST SERPL W P-5'-P-CCNC: 29 IU/L (ref 10–42)
BASOPHILS NFR BLD AUTO: 0 10^3/UL (ref 0–0.1)
BASOPHILS NFR BLD AUTO: 0.5 %
BILIRUB BLD-MCNC: 0.9 MG/DL (ref 0.2–1)
BUN SERPL-MCNC: 12 MG/DL (ref 6–20)
CALCIUM UR-MCNC: 9 MG/DL (ref 8.5–10.3)
CHLORIDE SERPL-SCNC: 104 MMOL/L (ref 101–111)
CO2 SERPL-SCNC: 23 MMOL/L (ref 21–32)
CREAT SERPLBLD-SCNC: 0.7 MG/DL (ref 0.4–1)
EOSINOPHIL # BLD AUTO: 0.1 10^3/UL (ref 0–0.7)
EOSINOPHIL NFR BLD AUTO: 1.8 %
ERYTHROCYTE [DISTWIDTH] IN BLOOD BY AUTOMATED COUNT: 20.8 % (ref 12–15)
GFRSERPLBLD MDRD-ARVRAT: 91 ML/MIN/{1.73_M2} (ref 89–?)
GLOBULIN SER-MCNC: 3.5 G/DL (ref 2.1–4.2)
GLUCOSE SERPL-MCNC: 111 MG/DL (ref 70–100)
HGB UR QL STRIP: 9.8 G/DL (ref 12–16)
LIPASE SERPL-CCNC: 40 U/L (ref 22–51)
LYMPHOCYTES # SPEC AUTO: 2.2 10^3/UL (ref 1.5–3.5)
LYMPHOCYTES NFR BLD AUTO: 39.3 %
MCH RBC QN AUTO: 22.1 PG (ref 27–31)
MCHC RBC AUTO-ENTMCNC: 30.2 G/DL (ref 32–36)
MCV RBC AUTO: 73 FL (ref 81–99)
MONOCYTES # BLD AUTO: 0.4 10^3/UL (ref 0–1)
MONOCYTES NFR BLD AUTO: 7.9 %
NEUTROPHILS # BLD AUTO: 2.7 10^3/UL (ref 1.5–6.6)
NEUTROPHILS # SNV AUTO: 5.5 X10^3/UL (ref 4.8–10.8)
NEUTROPHILS NFR BLD AUTO: 50.1 %
PDW BLD AUTO: 8.5 FL (ref 7.9–10.8)
PLAT MORPH BLD: (no result)
PLATELET # BLD: 270 10^3/UL (ref 130–450)
PLATELET BLD QL SMEAR: (no result)
PROT SPEC-MCNC: 7.3 G/DL (ref 6.7–8.2)
RBC MAR: 4.44 10^6/UL (ref 4.2–5.4)
RBC MORPH BLD: (no result)
SODIUM SERPLBLD-SCNC: 139 MMOL/L (ref 135–145)

## 2019-08-15 PROCEDURE — 96361 HYDRATE IV INFUSION ADD-ON: CPT

## 2019-08-15 PROCEDURE — 96374 THER/PROPH/DIAG INJ IV PUSH: CPT

## 2019-08-15 PROCEDURE — 36415 COLL VENOUS BLD VENIPUNCTURE: CPT

## 2019-08-15 PROCEDURE — 80053 COMPREHEN METABOLIC PANEL: CPT

## 2019-08-15 PROCEDURE — 99284 EMERGENCY DEPT VISIT MOD MDM: CPT

## 2019-08-15 PROCEDURE — 96375 TX/PRO/DX INJ NEW DRUG ADDON: CPT

## 2019-08-15 PROCEDURE — 96376 TX/PRO/DX INJ SAME DRUG ADON: CPT

## 2019-08-15 PROCEDURE — 85025 COMPLETE CBC W/AUTO DIFF WBC: CPT

## 2019-08-15 PROCEDURE — 84703 CHORIONIC GONADOTROPIN ASSAY: CPT

## 2019-08-15 PROCEDURE — 83690 ASSAY OF LIPASE: CPT

## 2019-08-18 ENCOUNTER — HOSPITAL ENCOUNTER (EMERGENCY)
Dept: HOSPITAL 76 - ED | Age: 44
Discharge: HOME | End: 2019-08-18
Payer: COMMERCIAL

## 2019-08-18 VITALS — DIASTOLIC BLOOD PRESSURE: 70 MMHG | SYSTOLIC BLOOD PRESSURE: 122 MMHG

## 2019-08-18 DIAGNOSIS — F17.200: ICD-10-CM

## 2019-08-18 DIAGNOSIS — R10.84: Primary | ICD-10-CM

## 2019-08-18 DIAGNOSIS — Z87.19: ICD-10-CM

## 2019-08-18 DIAGNOSIS — R11.2: ICD-10-CM

## 2019-08-18 DIAGNOSIS — D64.9: ICD-10-CM

## 2019-08-18 LAB
ALBUMIN DIAFP-MCNC: 3.7 G/DL (ref 3.2–5.5)
ALBUMIN/GLOB SERPL: 1.2 {RATIO} (ref 1–2.2)
ALP SERPL-CCNC: 59 IU/L (ref 42–121)
ALT SERPL W P-5'-P-CCNC: 14 IU/L (ref 10–60)
ANION GAP SERPL CALCULATED.4IONS-SCNC: 10 MMOL/L (ref 6–13)
AST SERPL W P-5'-P-CCNC: 23 IU/L (ref 10–42)
BASOPHILS NFR BLD AUTO: 0 10^3/UL (ref 0–0.1)
BASOPHILS NFR BLD AUTO: 0.3 %
BILIRUB BLD-MCNC: 0.5 MG/DL (ref 0.2–1)
BUN SERPL-MCNC: 13 MG/DL (ref 6–20)
CALCIUM UR-MCNC: 8.8 MG/DL (ref 8.5–10.3)
CHLORIDE SERPL-SCNC: 107 MMOL/L (ref 101–111)
CO2 SERPL-SCNC: 23 MMOL/L (ref 21–32)
CREAT SERPLBLD-SCNC: 0.6 MG/DL (ref 0.4–1)
EOSINOPHIL # BLD AUTO: 0.2 10^3/UL (ref 0–0.7)
EOSINOPHIL NFR BLD AUTO: 2 %
ERYTHROCYTE [DISTWIDTH] IN BLOOD BY AUTOMATED COUNT: 20.8 % (ref 12–15)
GFRSERPLBLD MDRD-ARVRAT: 109 ML/MIN/{1.73_M2} (ref 89–?)
GLOBULIN SER-MCNC: 3.1 G/DL (ref 2.1–4.2)
GLUCOSE SERPL-MCNC: 91 MG/DL (ref 70–100)
HGB UR QL STRIP: 9.3 G/DL (ref 12–16)
LIPASE SERPL-CCNC: 55 U/L (ref 22–51)
LYMPHOCYTES # SPEC AUTO: 2.2 10^3/UL (ref 1.5–3.5)
LYMPHOCYTES NFR BLD AUTO: 23.5 %
MCH RBC QN AUTO: 22 PG (ref 27–31)
MCHC RBC AUTO-ENTMCNC: 30.3 G/DL (ref 32–36)
MCV RBC AUTO: 72.7 FL (ref 81–99)
MONOCYTES # BLD AUTO: 0.5 10^3/UL (ref 0–1)
MONOCYTES NFR BLD AUTO: 4.8 %
NEUTROPHILS # BLD AUTO: 6.5 10^3/UL (ref 1.5–6.6)
NEUTROPHILS # SNV AUTO: 9.5 X10^3/UL (ref 4.8–10.8)
NEUTROPHILS NFR BLD AUTO: 68.9 %
PDW BLD AUTO: 8.9 FL (ref 7.9–10.8)
PLATELET # BLD: 294 10^3/UL (ref 130–450)
PLATELET BLD QL SMEAR: (no result)
PROT SPEC-MCNC: 6.8 G/DL (ref 6.7–8.2)
RBC MAR: 4.22 10^6/UL (ref 4.2–5.4)
RBC MORPH BLD: (no result)
SODIUM SERPLBLD-SCNC: 140 MMOL/L (ref 135–145)

## 2019-08-18 PROCEDURE — 96374 THER/PROPH/DIAG INJ IV PUSH: CPT

## 2019-08-18 PROCEDURE — 99284 EMERGENCY DEPT VISIT MOD MDM: CPT

## 2019-08-18 PROCEDURE — 36415 COLL VENOUS BLD VENIPUNCTURE: CPT

## 2019-08-18 PROCEDURE — 83690 ASSAY OF LIPASE: CPT

## 2019-08-18 PROCEDURE — 96361 HYDRATE IV INFUSION ADD-ON: CPT

## 2019-08-18 PROCEDURE — 96375 TX/PRO/DX INJ NEW DRUG ADDON: CPT

## 2019-08-18 PROCEDURE — 96376 TX/PRO/DX INJ SAME DRUG ADON: CPT

## 2019-08-18 PROCEDURE — 80053 COMPREHEN METABOLIC PANEL: CPT

## 2019-08-18 PROCEDURE — 85025 COMPLETE CBC W/AUTO DIFF WBC: CPT

## 2019-09-16 ENCOUNTER — HOSPITAL ENCOUNTER (EMERGENCY)
Dept: HOSPITAL 76 - ED | Age: 44
Discharge: LEFT BEFORE DISCHARGE | End: 2019-09-16
Payer: COMMERCIAL

## 2019-09-16 VITALS — SYSTOLIC BLOOD PRESSURE: 124 MMHG | DIASTOLIC BLOOD PRESSURE: 86 MMHG

## 2019-09-16 DIAGNOSIS — Z90.49: ICD-10-CM

## 2019-09-16 DIAGNOSIS — R10.11: ICD-10-CM

## 2019-09-16 DIAGNOSIS — R11.2: Primary | ICD-10-CM

## 2019-09-16 DIAGNOSIS — F17.200: ICD-10-CM

## 2019-09-16 DIAGNOSIS — Z87.19: ICD-10-CM

## 2019-09-16 LAB
ALBUMIN DIAFP-MCNC: 4.1 G/DL (ref 3.2–5.5)
ALBUMIN/GLOB SERPL: 1.2 {RATIO} (ref 1–2.2)
ALP SERPL-CCNC: 77 IU/L (ref 42–121)
ALT SERPL W P-5'-P-CCNC: 13 IU/L (ref 10–60)
ANION GAP SERPL CALCULATED.4IONS-SCNC: 9 MMOL/L (ref 6–13)
AST SERPL W P-5'-P-CCNC: 27 IU/L (ref 10–42)
BASOPHILS NFR BLD AUTO: 0 10^3/UL (ref 0–0.1)
BASOPHILS NFR BLD AUTO: 0.3 %
BILIRUB BLD-MCNC: 0.5 MG/DL (ref 0.2–1)
BUN SERPL-MCNC: 8 MG/DL (ref 6–20)
CALCIUM UR-MCNC: 9.2 MG/DL (ref 8.5–10.3)
CHLORIDE SERPL-SCNC: 105 MMOL/L (ref 101–111)
CLARITY UR REFRACT.AUTO: CLEAR
CO2 SERPL-SCNC: 26 MMOL/L (ref 21–32)
CREAT SERPLBLD-SCNC: 0.5 MG/DL (ref 0.4–1)
EOSINOPHIL # BLD AUTO: 0 10^3/UL (ref 0–0.7)
EOSINOPHIL NFR BLD AUTO: 0.4 %
ERYTHROCYTE [DISTWIDTH] IN BLOOD BY AUTOMATED COUNT: 21.8 % (ref 12–15)
GFRSERPLBLD MDRD-ARVRAT: 135 ML/MIN/{1.73_M2} (ref 89–?)
GLOBULIN SER-MCNC: 3.3 G/DL (ref 2.1–4.2)
GLUCOSE SERPL-MCNC: 83 MG/DL (ref 70–100)
GLUCOSE UR QL STRIP.AUTO: NEGATIVE MG/DL
HCG UR QL: NEGATIVE
HGB UR QL STRIP: 9.9 G/DL (ref 12–16)
KETONES UR QL STRIP.AUTO: NEGATIVE MG/DL
LIPASE SERPL-CCNC: 57 U/L (ref 22–51)
LYMPHOCYTES # SPEC AUTO: 1.4 10^3/UL (ref 1.5–3.5)
LYMPHOCYTES NFR BLD AUTO: 21.1 %
MCH RBC QN AUTO: 21.8 PG (ref 27–31)
MCHC RBC AUTO-ENTMCNC: 29.8 G/DL (ref 32–36)
MCV RBC AUTO: 73.1 FL (ref 81–99)
MONOCYTES # BLD AUTO: 0.4 10^3/UL (ref 0–1)
MONOCYTES NFR BLD AUTO: 5.3 %
NEUTROPHILS # BLD AUTO: 4.9 10^3/UL (ref 1.5–6.6)
NEUTROPHILS # SNV AUTO: 6.8 X10^3/UL (ref 4.8–10.8)
NEUTROPHILS NFR BLD AUTO: 72.3 %
NITRITE UR QL STRIP.AUTO: NEGATIVE
PDW BLD AUTO: 8.5 FL (ref 7.9–10.8)
PH UR STRIP.AUTO: 7 PH (ref 5–7.5)
PLAT MORPH BLD: (no result)
PLATELET # BLD: 379 10^3/UL (ref 130–450)
PLATELET BLD QL SMEAR: (no result)
PROT SPEC-MCNC: 7.4 G/DL (ref 6.7–8.2)
PROT UR STRIP.AUTO-MCNC: NEGATIVE MG/DL
RBC # UR STRIP.AUTO: NEGATIVE /UL
RBC MAR: 4.54 10^6/UL (ref 4.2–5.4)
RBC MORPH BLD: (no result)
SODIUM SERPLBLD-SCNC: 140 MMOL/L (ref 135–145)
SP GR UR STRIP.AUTO: 1.01 (ref 1–1.03)
UROBILINOGEN UR QL STRIP.AUTO: (no result) E.U./DL
UROBILINOGEN UR STRIP.AUTO-MCNC: NEGATIVE MG/DL

## 2019-09-16 PROCEDURE — 81025 URINE PREGNANCY TEST: CPT

## 2019-09-16 PROCEDURE — 96374 THER/PROPH/DIAG INJ IV PUSH: CPT

## 2019-09-16 PROCEDURE — 81003 URINALYSIS AUTO W/O SCOPE: CPT

## 2019-09-16 PROCEDURE — 99284 EMERGENCY DEPT VISIT MOD MDM: CPT

## 2019-09-16 PROCEDURE — 83690 ASSAY OF LIPASE: CPT

## 2019-09-16 PROCEDURE — 36415 COLL VENOUS BLD VENIPUNCTURE: CPT

## 2019-09-16 PROCEDURE — 87086 URINE CULTURE/COLONY COUNT: CPT

## 2019-09-16 PROCEDURE — 85025 COMPLETE CBC W/AUTO DIFF WBC: CPT

## 2019-09-16 PROCEDURE — 81001 URINALYSIS AUTO W/SCOPE: CPT

## 2019-09-16 PROCEDURE — 80053 COMPREHEN METABOLIC PANEL: CPT

## 2019-10-28 ENCOUNTER — HOSPITAL ENCOUNTER (EMERGENCY)
Dept: HOSPITAL 76 - ED | Age: 44
Discharge: HOME | End: 2019-10-28
Payer: COMMERCIAL

## 2019-10-28 VITALS — SYSTOLIC BLOOD PRESSURE: 112 MMHG | DIASTOLIC BLOOD PRESSURE: 70 MMHG

## 2019-10-28 DIAGNOSIS — Z90.49: ICD-10-CM

## 2019-10-28 DIAGNOSIS — F17.200: ICD-10-CM

## 2019-10-28 DIAGNOSIS — K85.80: Primary | ICD-10-CM

## 2019-10-28 LAB
ALBUMIN DIAFP-MCNC: 4.4 G/DL (ref 3.2–5.5)
ALBUMIN/GLOB SERPL: 1.2 {RATIO} (ref 1–2.2)
ALP SERPL-CCNC: 58 IU/L (ref 42–121)
ALT SERPL W P-5'-P-CCNC: 17 IU/L (ref 10–60)
ANION GAP SERPL CALCULATED.4IONS-SCNC: 9 MMOL/L (ref 6–13)
AST SERPL W P-5'-P-CCNC: 23 IU/L (ref 10–42)
BASOPHILS NFR BLD AUTO: 0 10^3/UL (ref 0–0.1)
BASOPHILS NFR BLD AUTO: 0.3 %
BILIRUB BLD-MCNC: 1.1 MG/DL (ref 0.2–1)
BUN SERPL-MCNC: 11 MG/DL (ref 6–20)
CALCIUM UR-MCNC: 9.4 MG/DL (ref 8.5–10.3)
CHLORIDE SERPL-SCNC: 101 MMOL/L (ref 101–111)
CO2 SERPL-SCNC: 27 MMOL/L (ref 21–32)
CREAT SERPLBLD-SCNC: 0.5 MG/DL (ref 0.4–1)
EOSINOPHIL # BLD AUTO: 0 10^3/UL (ref 0–0.7)
EOSINOPHIL NFR BLD AUTO: 0.6 %
ERYTHROCYTE [DISTWIDTH] IN BLOOD BY AUTOMATED COUNT: 23.6 % (ref 12–15)
GFRSERPLBLD MDRD-ARVRAT: 135 ML/MIN/{1.73_M2} (ref 89–?)
GLOBULIN SER-MCNC: 3.6 G/DL (ref 2.1–4.2)
GLUCOSE SERPL-MCNC: 93 MG/DL (ref 70–100)
HGB UR QL STRIP: 10.2 G/DL (ref 12–16)
LIPASE SERPL-CCNC: 62 U/L (ref 22–51)
LYMPHOCYTES # SPEC AUTO: 2.9 10^3/UL (ref 1.5–3.5)
LYMPHOCYTES NFR BLD AUTO: 44.3 %
MCH RBC QN AUTO: 21.8 PG (ref 27–31)
MCHC RBC AUTO-ENTMCNC: 29.1 G/DL (ref 32–36)
MCV RBC AUTO: 74.8 FL (ref 81–99)
MONOCYTES # BLD AUTO: 0.4 10^3/UL (ref 0–1)
MONOCYTES NFR BLD AUTO: 5.6 %
NEUTROPHILS # BLD AUTO: 3.2 10^3/UL (ref 1.5–6.6)
NEUTROPHILS # SNV AUTO: 6.6 X10^3/UL (ref 4.8–10.8)
NEUTROPHILS NFR BLD AUTO: 48.9 %
PDW BLD AUTO: 8.4 FL (ref 7.9–10.8)
PLAT MORPH BLD: (no result)
PLATELET # BLD: 378 10^3/UL (ref 130–450)
PLATELET BLD QL SMEAR: (no result)
PROT SPEC-MCNC: 8 G/DL (ref 6.7–8.2)
RBC MAR: 4.68 10^6/UL (ref 4.2–5.4)
RBC MORPH BLD: (no result)
SODIUM SERPLBLD-SCNC: 137 MMOL/L (ref 135–145)

## 2019-10-28 PROCEDURE — 83690 ASSAY OF LIPASE: CPT

## 2019-10-28 PROCEDURE — 99284 EMERGENCY DEPT VISIT MOD MDM: CPT

## 2019-10-28 PROCEDURE — 99283 EMERGENCY DEPT VISIT LOW MDM: CPT

## 2019-10-28 PROCEDURE — 85025 COMPLETE CBC W/AUTO DIFF WBC: CPT

## 2019-10-28 PROCEDURE — 80053 COMPREHEN METABOLIC PANEL: CPT

## 2019-10-28 PROCEDURE — 36415 COLL VENOUS BLD VENIPUNCTURE: CPT

## 2019-10-30 ENCOUNTER — HOSPITAL ENCOUNTER (EMERGENCY)
Dept: HOSPITAL 76 - ED | Age: 44
Discharge: HOME | End: 2019-10-30
Payer: COMMERCIAL

## 2019-10-30 VITALS — DIASTOLIC BLOOD PRESSURE: 81 MMHG | SYSTOLIC BLOOD PRESSURE: 123 MMHG

## 2019-10-30 DIAGNOSIS — F17.200: ICD-10-CM

## 2019-10-30 DIAGNOSIS — R10.84: Primary | ICD-10-CM

## 2019-10-30 LAB
ALBUMIN DIAFP-MCNC: 4.6 G/DL (ref 3.2–5.5)
ALBUMIN/GLOB SERPL: 1.2 {RATIO} (ref 1–2.2)
ALP SERPL-CCNC: 74 IU/L (ref 42–121)
ALT SERPL W P-5'-P-CCNC: 17 IU/L (ref 10–60)
ANION GAP SERPL CALCULATED.4IONS-SCNC: 11 MMOL/L (ref 6–13)
AST SERPL W P-5'-P-CCNC: 29 IU/L (ref 10–42)
BASOPHILS NFR BLD AUTO: 0 10^3/UL (ref 0–0.1)
BASOPHILS NFR BLD AUTO: 0.3 %
BILIRUB BLD-MCNC: 1.1 MG/DL (ref 0.2–1)
BUN SERPL-MCNC: 10 MG/DL (ref 6–20)
CALCIUM UR-MCNC: 9 MG/DL (ref 8.5–10.3)
CHLORIDE SERPL-SCNC: 99 MMOL/L (ref 101–111)
CLARITY UR REFRACT.AUTO: CLEAR
CO2 SERPL-SCNC: 26 MMOL/L (ref 21–32)
CREAT SERPLBLD-SCNC: 0.6 MG/DL (ref 0.4–1)
EOSINOPHIL # BLD AUTO: 0.1 10^3/UL (ref 0–0.7)
EOSINOPHIL NFR BLD AUTO: 1.5 %
ERYTHROCYTE [DISTWIDTH] IN BLOOD BY AUTOMATED COUNT: 23.1 % (ref 12–15)
GFRSERPLBLD MDRD-ARVRAT: 109 ML/MIN/{1.73_M2} (ref 89–?)
GLOBULIN SER-MCNC: 3.7 G/DL (ref 2.1–4.2)
GLUCOSE SERPL-MCNC: 114 MG/DL (ref 70–100)
GLUCOSE UR QL STRIP.AUTO: NEGATIVE MG/DL
HCG UR QL: NEGATIVE
HGB UR QL STRIP: 9.4 G/DL (ref 12–16)
KETONES UR QL STRIP.AUTO: NEGATIVE MG/DL
LIPASE SERPL-CCNC: 38 U/L (ref 22–51)
LYMPHOCYTES # SPEC AUTO: 3.1 10^3/UL (ref 1.5–3.5)
LYMPHOCYTES NFR BLD AUTO: 53 %
MCH RBC QN AUTO: 21.9 PG (ref 27–31)
MCHC RBC AUTO-ENTMCNC: 29.4 G/DL (ref 32–36)
MCV RBC AUTO: 74.6 FL (ref 81–99)
MONOCYTES # BLD AUTO: 0.5 10^3/UL (ref 0–1)
MONOCYTES NFR BLD AUTO: 8.1 %
NEUTROPHILS # BLD AUTO: 2.2 10^3/UL (ref 1.5–6.6)
NEUTROPHILS # SNV AUTO: 5.9 X10^3/UL (ref 4.8–10.8)
NEUTROPHILS NFR BLD AUTO: 36.9 %
NITRITE UR QL STRIP.AUTO: NEGATIVE
PDW BLD AUTO: 8.7 FL (ref 7.9–10.8)
PH UR STRIP.AUTO: 5.5 PH (ref 5–7.5)
PLATELET # BLD: 332 10^3/UL (ref 130–450)
PLATELET BLD QL SMEAR: (no result)
PROT SPEC-MCNC: 8.3 G/DL (ref 6.7–8.2)
PROT UR STRIP.AUTO-MCNC: NEGATIVE MG/DL
RBC # UR STRIP.AUTO: (no result) /UL
RBC MAR: 4.29 10^6/UL (ref 4.2–5.4)
RBC MORPH BLD: (no result)
SODIUM SERPLBLD-SCNC: 136 MMOL/L (ref 135–145)
SP GR UR STRIP.AUTO: 1.02 (ref 1–1.03)
UROBILINOGEN UR QL STRIP.AUTO: (no result) E.U./DL
UROBILINOGEN UR STRIP.AUTO-MCNC: NEGATIVE MG/DL

## 2019-10-30 PROCEDURE — 36415 COLL VENOUS BLD VENIPUNCTURE: CPT

## 2019-10-30 PROCEDURE — 85025 COMPLETE CBC W/AUTO DIFF WBC: CPT

## 2019-10-30 PROCEDURE — 81001 URINALYSIS AUTO W/SCOPE: CPT

## 2019-10-30 PROCEDURE — 80053 COMPREHEN METABOLIC PANEL: CPT

## 2019-10-30 PROCEDURE — 96374 THER/PROPH/DIAG INJ IV PUSH: CPT

## 2019-10-30 PROCEDURE — 81025 URINE PREGNANCY TEST: CPT

## 2019-10-30 PROCEDURE — 87086 URINE CULTURE/COLONY COUNT: CPT

## 2019-10-30 PROCEDURE — 83690 ASSAY OF LIPASE: CPT

## 2019-10-30 PROCEDURE — 99283 EMERGENCY DEPT VISIT LOW MDM: CPT

## 2019-10-30 PROCEDURE — 81003 URINALYSIS AUTO W/O SCOPE: CPT

## 2019-10-30 PROCEDURE — 99284 EMERGENCY DEPT VISIT MOD MDM: CPT

## 2019-12-15 ENCOUNTER — HOSPITAL ENCOUNTER (EMERGENCY)
Dept: HOSPITAL 76 - ED | Age: 44
Discharge: HOME | End: 2019-12-15
Payer: COMMERCIAL

## 2019-12-15 VITALS — DIASTOLIC BLOOD PRESSURE: 77 MMHG | SYSTOLIC BLOOD PRESSURE: 113 MMHG

## 2019-12-15 DIAGNOSIS — G43.101: Primary | ICD-10-CM

## 2019-12-15 DIAGNOSIS — F17.200: ICD-10-CM

## 2019-12-15 PROCEDURE — 96361 HYDRATE IV INFUSION ADD-ON: CPT

## 2019-12-15 PROCEDURE — 99283 EMERGENCY DEPT VISIT LOW MDM: CPT

## 2019-12-15 PROCEDURE — 99284 EMERGENCY DEPT VISIT MOD MDM: CPT

## 2019-12-15 PROCEDURE — 96374 THER/PROPH/DIAG INJ IV PUSH: CPT

## 2019-12-15 PROCEDURE — 96376 TX/PRO/DX INJ SAME DRUG ADON: CPT

## 2020-01-16 ENCOUNTER — HOSPITAL ENCOUNTER (EMERGENCY)
Dept: HOSPITAL 76 - ED | Age: 45
Discharge: HOME | End: 2020-01-16
Payer: COMMERCIAL

## 2020-01-16 VITALS — SYSTOLIC BLOOD PRESSURE: 110 MMHG | DIASTOLIC BLOOD PRESSURE: 68 MMHG

## 2020-01-16 DIAGNOSIS — Z90.49: ICD-10-CM

## 2020-01-16 DIAGNOSIS — R11.2: ICD-10-CM

## 2020-01-16 DIAGNOSIS — Z87.19: ICD-10-CM

## 2020-01-16 DIAGNOSIS — F17.200: ICD-10-CM

## 2020-01-16 DIAGNOSIS — G89.29: ICD-10-CM

## 2020-01-16 DIAGNOSIS — R10.13: Primary | ICD-10-CM

## 2020-01-16 DIAGNOSIS — Z98.84: ICD-10-CM

## 2020-01-16 LAB
ALBUMIN DIAFP-MCNC: 4 G/DL (ref 3.2–5.5)
ALBUMIN/GLOB SERPL: 1.2 {RATIO} (ref 1–2.2)
ALP SERPL-CCNC: 55 IU/L (ref 42–121)
ALT SERPL W P-5'-P-CCNC: 16 IU/L (ref 10–60)
ANION GAP SERPL CALCULATED.4IONS-SCNC: 11 MMOL/L (ref 6–13)
AST SERPL W P-5'-P-CCNC: 24 IU/L (ref 10–42)
BASOPHILS NFR BLD AUTO: 0 10^3/UL (ref 0–0.1)
BASOPHILS NFR BLD AUTO: 0.5 %
BILIRUB BLD-MCNC: 0.9 MG/DL (ref 0.2–1)
BUN SERPL-MCNC: 12 MG/DL (ref 6–20)
CALCIUM UR-MCNC: 8.8 MG/DL (ref 8.5–10.3)
CHLORIDE SERPL-SCNC: 101 MMOL/L (ref 101–111)
CO2 SERPL-SCNC: 25 MMOL/L (ref 21–32)
CREAT SERPLBLD-SCNC: 0.7 MG/DL (ref 0.4–1)
EOSINOPHIL # BLD AUTO: 0 10^3/UL (ref 0–0.7)
EOSINOPHIL NFR BLD AUTO: 0.7 %
ERYTHROCYTE [DISTWIDTH] IN BLOOD BY AUTOMATED COUNT: 20.8 % (ref 12–15)
GFRSERPLBLD MDRD-ARVRAT: 91 ML/MIN/{1.73_M2} (ref 89–?)
GLOBULIN SER-MCNC: 3.3 G/DL (ref 2.1–4.2)
GLUCOSE SERPL-MCNC: 147 MG/DL (ref 70–100)
HGB UR QL STRIP: 9.3 G/DL (ref 12–16)
LIPASE SERPL-CCNC: 69 U/L (ref 22–51)
LYMPHOCYTES # SPEC AUTO: 2.5 10^3/UL (ref 1.5–3.5)
LYMPHOCYTES NFR BLD AUTO: 41.5 %
MCH RBC QN AUTO: 21.5 PG (ref 27–31)
MCHC RBC AUTO-ENTMCNC: 28.9 G/DL (ref 32–36)
MCV RBC AUTO: 74.5 FL (ref 81–99)
MONOCYTES # BLD AUTO: 0.4 10^3/UL (ref 0–1)
MONOCYTES NFR BLD AUTO: 7.3 %
NEUTROPHILS # BLD AUTO: 2.9 10^3/UL (ref 1.5–6.6)
NEUTROPHILS # SNV AUTO: 5.9 X10^3/UL (ref 4.8–10.8)
NEUTROPHILS NFR BLD AUTO: 49.3 %
PDW BLD AUTO: 8.3 FL (ref 7.9–10.8)
PLAT MORPH BLD: (no result)
PLATELET # BLD: 263 10^3/UL (ref 130–450)
PLATELET BLD QL SMEAR: (no result)
PROT SPEC-MCNC: 7.3 G/DL (ref 6.7–8.2)
RBC MAR: 4.32 10^6/UL (ref 4.2–5.4)
RBC MORPH BLD: (no result)
SODIUM SERPLBLD-SCNC: 137 MMOL/L (ref 135–145)

## 2020-01-16 PROCEDURE — 96374 THER/PROPH/DIAG INJ IV PUSH: CPT

## 2020-01-16 PROCEDURE — 36415 COLL VENOUS BLD VENIPUNCTURE: CPT

## 2020-01-16 PROCEDURE — 96375 TX/PRO/DX INJ NEW DRUG ADDON: CPT

## 2020-01-16 PROCEDURE — 83690 ASSAY OF LIPASE: CPT

## 2020-01-16 PROCEDURE — 99283 EMERGENCY DEPT VISIT LOW MDM: CPT

## 2020-01-16 PROCEDURE — 99284 EMERGENCY DEPT VISIT MOD MDM: CPT

## 2020-01-16 PROCEDURE — 96376 TX/PRO/DX INJ SAME DRUG ADON: CPT

## 2020-01-16 PROCEDURE — 85025 COMPLETE CBC W/AUTO DIFF WBC: CPT

## 2020-01-16 PROCEDURE — 80053 COMPREHEN METABOLIC PANEL: CPT

## 2020-02-07 ENCOUNTER — HOSPITAL ENCOUNTER (OUTPATIENT)
Dept: HOSPITAL 76 - LAB | Age: 45
Discharge: HOME | End: 2020-02-07
Payer: COMMERCIAL

## 2020-02-07 DIAGNOSIS — Z01.89: Primary | ICD-10-CM

## 2020-02-07 PROCEDURE — 36415 COLL VENOUS BLD VENIPUNCTURE: CPT

## 2020-02-21 ENCOUNTER — HOSPITAL ENCOUNTER (EMERGENCY)
Dept: HOSPITAL 76 - ED | Age: 45
Discharge: HOME | End: 2020-02-21
Payer: COMMERCIAL

## 2020-02-21 VITALS — DIASTOLIC BLOOD PRESSURE: 89 MMHG | SYSTOLIC BLOOD PRESSURE: 129 MMHG

## 2020-02-21 DIAGNOSIS — R10.9: Primary | ICD-10-CM

## 2020-02-21 DIAGNOSIS — F17.200: ICD-10-CM

## 2020-02-21 LAB
ALBUMIN DIAFP-MCNC: 4.3 G/DL (ref 3.2–5.5)
ALBUMIN/GLOB SERPL: 1.3 {RATIO} (ref 1–2.2)
ALP SERPL-CCNC: 60 IU/L (ref 42–121)
ALT SERPL W P-5'-P-CCNC: 17 IU/L (ref 10–60)
ANION GAP SERPL CALCULATED.4IONS-SCNC: 10 MMOL/L (ref 6–13)
AST SERPL W P-5'-P-CCNC: 21 IU/L (ref 10–42)
BASOPHILS NFR BLD AUTO: 0.1 10^3/UL (ref 0–0.1)
BASOPHILS NFR BLD AUTO: 0.9 %
BILIRUB BLD-MCNC: 1.3 MG/DL (ref 0.2–1)
BUN SERPL-MCNC: 10 MG/DL (ref 6–20)
CALCIUM UR-MCNC: 9.4 MG/DL (ref 8.5–10.3)
CHLORIDE SERPL-SCNC: 103 MMOL/L (ref 101–111)
CO2 SERPL-SCNC: 25 MMOL/L (ref 21–32)
CREAT SERPLBLD-SCNC: 0.5 MG/DL (ref 0.4–1)
EOSINOPHIL # BLD AUTO: 0.1 10^3/UL (ref 0–0.7)
EOSINOPHIL NFR BLD AUTO: 1 %
ERYTHROCYTE [DISTWIDTH] IN BLOOD BY AUTOMATED COUNT: 22.5 % (ref 12–15)
GFRSERPLBLD MDRD-ARVRAT: 135 ML/MIN/{1.73_M2} (ref 89–?)
GLOBULIN SER-MCNC: 3.4 G/DL (ref 2.1–4.2)
GLUCOSE SERPL-MCNC: 104 MG/DL (ref 70–100)
HGB UR QL STRIP: 9.6 G/DL (ref 12–16)
LIPASE SERPL-CCNC: 56 U/L (ref 22–51)
LYMPHOCYTES # SPEC AUTO: 2.2 10^3/UL (ref 1.5–3.5)
LYMPHOCYTES NFR BLD AUTO: 32.5 %
MCH RBC QN AUTO: 20.4 PG (ref 27–31)
MCHC RBC AUTO-ENTMCNC: 28.5 G/DL (ref 32–36)
MCV RBC AUTO: 71.7 FL (ref 81–99)
MONOCYTES # BLD AUTO: 0.4 10^3/UL (ref 0–1)
MONOCYTES NFR BLD AUTO: 5.4 %
NEUTROPHILS # BLD AUTO: 4.1 10^3/UL (ref 1.5–6.6)
NEUTROPHILS # SNV AUTO: 6.8 X10^3/UL (ref 4.8–10.8)
NEUTROPHILS NFR BLD AUTO: 59.8 %
PDW BLD AUTO: 8.6 FL (ref 7.9–10.8)
PLAT MORPH BLD: (no result)
PLATELET # BLD: 670 10^3/UL (ref 130–450)
PLATELET BLD QL SMEAR: (no result)
PROT SPEC-MCNC: 7.7 G/DL (ref 6.7–8.2)
RBC MAR: 4.7 10^6/UL (ref 4.2–5.4)
RBC MORPH BLD: (no result)
SODIUM SERPLBLD-SCNC: 138 MMOL/L (ref 135–145)

## 2020-02-21 PROCEDURE — 99284 EMERGENCY DEPT VISIT MOD MDM: CPT

## 2020-02-21 PROCEDURE — 96375 TX/PRO/DX INJ NEW DRUG ADDON: CPT

## 2020-02-21 PROCEDURE — 85025 COMPLETE CBC W/AUTO DIFF WBC: CPT

## 2020-02-21 PROCEDURE — 96365 THER/PROPH/DIAG IV INF INIT: CPT

## 2020-02-21 PROCEDURE — 36415 COLL VENOUS BLD VENIPUNCTURE: CPT

## 2020-02-21 PROCEDURE — 83690 ASSAY OF LIPASE: CPT

## 2020-02-21 PROCEDURE — 80053 COMPREHEN METABOLIC PANEL: CPT

## 2020-02-21 PROCEDURE — 74177 CT ABD & PELVIS W/CONTRAST: CPT

## 2020-02-22 ENCOUNTER — HOSPITAL ENCOUNTER (EMERGENCY)
Dept: HOSPITAL 76 - ED | Age: 45
Discharge: HOME | End: 2020-02-22
Payer: COMMERCIAL

## 2020-02-22 VITALS — SYSTOLIC BLOOD PRESSURE: 117 MMHG | DIASTOLIC BLOOD PRESSURE: 79 MMHG

## 2020-02-22 DIAGNOSIS — G89.29: Primary | ICD-10-CM

## 2020-02-22 DIAGNOSIS — R10.13: ICD-10-CM

## 2020-02-22 DIAGNOSIS — F17.200: ICD-10-CM

## 2020-02-22 PROCEDURE — 96372 THER/PROPH/DIAG INJ SC/IM: CPT

## 2020-02-22 PROCEDURE — 99283 EMERGENCY DEPT VISIT LOW MDM: CPT

## 2020-02-22 PROCEDURE — 99284 EMERGENCY DEPT VISIT MOD MDM: CPT

## 2020-03-07 ENCOUNTER — HOSPITAL ENCOUNTER (EMERGENCY)
Dept: HOSPITAL 76 - ED | Age: 45
Discharge: HOME | End: 2020-03-07
Payer: COMMERCIAL

## 2020-03-07 VITALS — SYSTOLIC BLOOD PRESSURE: 102 MMHG | DIASTOLIC BLOOD PRESSURE: 82 MMHG

## 2020-03-07 DIAGNOSIS — M25.551: ICD-10-CM

## 2020-03-07 DIAGNOSIS — K85.00: Primary | ICD-10-CM

## 2020-03-07 LAB
ALBUMIN DIAFP-MCNC: 3.7 G/DL (ref 3.2–5.5)
ALBUMIN/GLOB SERPL: 1.1 {RATIO} (ref 1–2.2)
ALP SERPL-CCNC: 68 IU/L (ref 42–121)
ALT SERPL W P-5'-P-CCNC: 19 IU/L (ref 10–60)
ANION GAP SERPL CALCULATED.4IONS-SCNC: 10 MMOL/L (ref 6–13)
AST SERPL W P-5'-P-CCNC: 29 IU/L (ref 10–42)
BILIRUB BLD-MCNC: 0.5 MG/DL (ref 0.2–1)
BUN SERPL-MCNC: 9 MG/DL (ref 6–20)
CALCIUM UR-MCNC: 8.4 MG/DL (ref 8.5–10.3)
CHLORIDE SERPL-SCNC: 103 MMOL/L (ref 101–111)
CO2 SERPL-SCNC: 23 MMOL/L (ref 21–32)
CREAT SERPLBLD-SCNC: 0.6 MG/DL (ref 0.4–1)
GFRSERPLBLD MDRD-ARVRAT: 109 ML/MIN/{1.73_M2} (ref 89–?)
GLOBULIN SER-MCNC: 3.3 G/DL (ref 2.1–4.2)
GLUCOSE SERPL-MCNC: 111 MG/DL (ref 70–100)
LIPASE SERPL-CCNC: 395 U/L (ref 22–51)
PROT SPEC-MCNC: 7 G/DL (ref 6.7–8.2)
SODIUM SERPLBLD-SCNC: 136 MMOL/L (ref 135–145)

## 2020-03-07 PROCEDURE — 96374 THER/PROPH/DIAG INJ IV PUSH: CPT

## 2020-03-07 PROCEDURE — 73502 X-RAY EXAM HIP UNI 2-3 VIEWS: CPT

## 2020-03-07 PROCEDURE — 80053 COMPREHEN METABOLIC PANEL: CPT

## 2020-03-07 PROCEDURE — 99284 EMERGENCY DEPT VISIT MOD MDM: CPT

## 2020-03-07 PROCEDURE — 83690 ASSAY OF LIPASE: CPT

## 2020-03-07 PROCEDURE — 96376 TX/PRO/DX INJ SAME DRUG ADON: CPT

## 2020-03-07 PROCEDURE — 36415 COLL VENOUS BLD VENIPUNCTURE: CPT

## 2020-03-08 ENCOUNTER — HOSPITAL ENCOUNTER (EMERGENCY)
Dept: HOSPITAL 76 - ED | Age: 45
Discharge: HOME | End: 2020-03-08
Payer: COMMERCIAL

## 2020-03-08 VITALS — SYSTOLIC BLOOD PRESSURE: 118 MMHG | DIASTOLIC BLOOD PRESSURE: 74 MMHG

## 2020-03-08 DIAGNOSIS — Z87.19: ICD-10-CM

## 2020-03-08 DIAGNOSIS — Z76.5: ICD-10-CM

## 2020-03-08 DIAGNOSIS — R74.8: ICD-10-CM

## 2020-03-08 DIAGNOSIS — Z98.84: ICD-10-CM

## 2020-03-08 DIAGNOSIS — G89.29: ICD-10-CM

## 2020-03-08 DIAGNOSIS — Z90.49: ICD-10-CM

## 2020-03-08 DIAGNOSIS — R10.13: Primary | ICD-10-CM

## 2020-03-08 DIAGNOSIS — F17.200: ICD-10-CM

## 2020-03-08 DIAGNOSIS — R11.2: ICD-10-CM

## 2020-03-08 LAB
ALBUMIN DIAFP-MCNC: 3.9 G/DL (ref 3.2–5.5)
ALBUMIN/GLOB SERPL: 1.3 {RATIO} (ref 1–2.2)
ALP SERPL-CCNC: 64 IU/L (ref 42–121)
ALT SERPL W P-5'-P-CCNC: 18 IU/L (ref 10–60)
ANION GAP SERPL CALCULATED.4IONS-SCNC: 9 MMOL/L (ref 6–13)
AST SERPL W P-5'-P-CCNC: 24 IU/L (ref 10–42)
BASOPHILS NFR BLD AUTO: 0.1 10^3/UL (ref 0–0.1)
BASOPHILS NFR BLD AUTO: 0.7 %
BILIRUB BLD-MCNC: 0.6 MG/DL (ref 0.2–1)
BUN SERPL-MCNC: 7 MG/DL (ref 6–20)
CALCIUM UR-MCNC: 8.5 MG/DL (ref 8.5–10.3)
CHLORIDE SERPL-SCNC: 101 MMOL/L (ref 101–111)
CLARITY UR REFRACT.AUTO: CLEAR
CO2 SERPL-SCNC: 26 MMOL/L (ref 21–32)
CREAT SERPLBLD-SCNC: 0.5 MG/DL (ref 0.4–1)
EOSINOPHIL # BLD AUTO: 0.1 10^3/UL (ref 0–0.7)
EOSINOPHIL NFR BLD AUTO: 0.9 %
ERYTHROCYTE [DISTWIDTH] IN BLOOD BY AUTOMATED COUNT: 25.4 % (ref 12–15)
GFRSERPLBLD MDRD-ARVRAT: 135 ML/MIN/{1.73_M2} (ref 89–?)
GLOBULIN SER-MCNC: 2.9 G/DL (ref 2.1–4.2)
GLUCOSE SERPL-MCNC: 93 MG/DL (ref 70–100)
GLUCOSE UR QL STRIP.AUTO: NEGATIVE MG/DL
HCG UR QL: NEGATIVE
HGB UR QL STRIP: 9.2 G/DL (ref 12–16)
KETONES UR QL STRIP.AUTO: NEGATIVE MG/DL
LIPASE SERPL-CCNC: 122 U/L (ref 22–51)
LYMPHOCYTES # SPEC AUTO: 1.6 10^3/UL (ref 1.5–3.5)
LYMPHOCYTES NFR BLD AUTO: 24 %
MCH RBC QN AUTO: 21.6 PG (ref 27–31)
MCHC RBC AUTO-ENTMCNC: 29.2 G/DL (ref 32–36)
MCV RBC AUTO: 73.9 FL (ref 81–99)
MONOCYTES # BLD AUTO: 0.5 10^3/UL (ref 0–1)
MONOCYTES NFR BLD AUTO: 6.9 %
NEUTROPHILS # BLD AUTO: 4.5 10^3/UL (ref 1.5–6.6)
NEUTROPHILS # SNV AUTO: 6.7 X10^3/UL (ref 4.8–10.8)
NEUTROPHILS NFR BLD AUTO: 67.1 %
NITRITE UR QL STRIP.AUTO: NEGATIVE
PDW BLD AUTO: 8.5 FL (ref 7.9–10.8)
PH UR STRIP.AUTO: 7.5 PH (ref 5–7.5)
PLAT MORPH BLD: (no result)
PLATELET # BLD: 256 10^3/UL (ref 130–450)
PLATELET BLD QL SMEAR: (no result)
PROT SPEC-MCNC: 6.8 G/DL (ref 6.7–8.2)
PROT UR STRIP.AUTO-MCNC: NEGATIVE MG/DL
RBC # UR STRIP.AUTO: NEGATIVE /UL
RBC MAR: 4.26 10^6/UL (ref 4.2–5.4)
RBC MORPH BLD: (no result)
SODIUM SERPLBLD-SCNC: 136 MMOL/L (ref 135–145)
SP GR UR STRIP.AUTO: 1.01 (ref 1–1.03)
UROBILINOGEN UR QL STRIP.AUTO: (no result) E.U./DL
UROBILINOGEN UR STRIP.AUTO-MCNC: NEGATIVE MG/DL

## 2020-03-08 PROCEDURE — 81001 URINALYSIS AUTO W/SCOPE: CPT

## 2020-03-08 PROCEDURE — 85025 COMPLETE CBC W/AUTO DIFF WBC: CPT

## 2020-03-08 PROCEDURE — 81025 URINE PREGNANCY TEST: CPT

## 2020-03-08 PROCEDURE — 80053 COMPREHEN METABOLIC PANEL: CPT

## 2020-03-08 PROCEDURE — 83690 ASSAY OF LIPASE: CPT

## 2020-03-08 PROCEDURE — 99283 EMERGENCY DEPT VISIT LOW MDM: CPT

## 2020-03-08 PROCEDURE — 96375 TX/PRO/DX INJ NEW DRUG ADDON: CPT

## 2020-03-08 PROCEDURE — 81003 URINALYSIS AUTO W/O SCOPE: CPT

## 2020-03-08 PROCEDURE — 83605 ASSAY OF LACTIC ACID: CPT

## 2020-03-08 PROCEDURE — 99284 EMERGENCY DEPT VISIT MOD MDM: CPT

## 2020-03-08 PROCEDURE — 87086 URINE CULTURE/COLONY COUNT: CPT

## 2020-03-08 PROCEDURE — 36415 COLL VENOUS BLD VENIPUNCTURE: CPT

## 2020-03-08 PROCEDURE — 96374 THER/PROPH/DIAG INJ IV PUSH: CPT

## 2020-03-17 ENCOUNTER — HOSPITAL ENCOUNTER (EMERGENCY)
Dept: HOSPITAL 76 - ED | Age: 45
Discharge: HOME | End: 2020-03-17
Payer: COMMERCIAL

## 2020-03-17 VITALS — DIASTOLIC BLOOD PRESSURE: 66 MMHG | SYSTOLIC BLOOD PRESSURE: 100 MMHG

## 2020-03-17 DIAGNOSIS — R10.11: Primary | ICD-10-CM

## 2020-03-17 DIAGNOSIS — F17.210: ICD-10-CM

## 2020-03-17 LAB
ALBUMIN DIAFP-MCNC: 3.8 G/DL (ref 3.2–5.5)
ALBUMIN/GLOB SERPL: 1.2 {RATIO} (ref 1–2.2)
ALP SERPL-CCNC: 56 IU/L (ref 42–121)
ALT SERPL W P-5'-P-CCNC: 15 IU/L (ref 10–60)
ANION GAP SERPL CALCULATED.4IONS-SCNC: 8 MMOL/L (ref 6–13)
AST SERPL W P-5'-P-CCNC: 21 IU/L (ref 10–42)
BASOPHILS NFR BLD AUTO: 0.1 10^3/UL (ref 0–0.1)
BASOPHILS NFR BLD AUTO: 0.8 %
BILIRUB BLD-MCNC: 0.7 MG/DL (ref 0.2–1)
BUN SERPL-MCNC: 8 MG/DL (ref 6–20)
CALCIUM UR-MCNC: 8.6 MG/DL (ref 8.5–10.3)
CHLORIDE SERPL-SCNC: 107 MMOL/L (ref 101–111)
CO2 SERPL-SCNC: 23 MMOL/L (ref 21–32)
CREAT SERPLBLD-SCNC: 0.6 MG/DL (ref 0.4–1)
EOSINOPHIL # BLD AUTO: 0.1 10^3/UL (ref 0–0.7)
EOSINOPHIL NFR BLD AUTO: 2.2 %
ERYTHROCYTE [DISTWIDTH] IN BLOOD BY AUTOMATED COUNT: 25 % (ref 12–15)
GFRSERPLBLD MDRD-ARVRAT: 109 ML/MIN/{1.73_M2} (ref 89–?)
GLOBULIN SER-MCNC: 3.1 G/DL (ref 2.1–4.2)
GLUCOSE SERPL-MCNC: 63 MG/DL (ref 70–100)
HGB UR QL STRIP: 8.5 G/DL (ref 12–16)
LIPASE SERPL-CCNC: 66 U/L (ref 22–51)
LYMPHOCYTES # SPEC AUTO: 2.8 10^3/UL (ref 1.5–3.5)
LYMPHOCYTES NFR BLD AUTO: 47.2 %
MCH RBC QN AUTO: 21.1 PG (ref 27–31)
MCHC RBC AUTO-ENTMCNC: 28.8 G/DL (ref 32–36)
MCV RBC AUTO: 73.4 FL (ref 81–99)
MONOCYTES # BLD AUTO: 0.6 10^3/UL (ref 0–1)
MONOCYTES NFR BLD AUTO: 9.4 %
NEUTROPHILS # BLD AUTO: 2.4 10^3/UL (ref 1.5–6.6)
NEUTROPHILS # SNV AUTO: 6 X10^3/UL (ref 4.8–10.8)
NEUTROPHILS NFR BLD AUTO: 40.1 %
PDW BLD AUTO: 8.8 FL (ref 7.9–10.8)
PLAT MORPH BLD: (no result)
PLATELET # BLD: 419 10^3/UL (ref 130–450)
PLATELET BLD QL SMEAR: (no result)
PROT SPEC-MCNC: 6.9 G/DL (ref 6.7–8.2)
RBC MAR: 4.02 10^6/UL (ref 4.2–5.4)
RBC MORPH BLD: (no result)
SODIUM SERPLBLD-SCNC: 138 MMOL/L (ref 135–145)

## 2020-03-17 PROCEDURE — 36415 COLL VENOUS BLD VENIPUNCTURE: CPT

## 2020-03-17 PROCEDURE — 80053 COMPREHEN METABOLIC PANEL: CPT

## 2020-03-17 PROCEDURE — 96374 THER/PROPH/DIAG INJ IV PUSH: CPT

## 2020-03-17 PROCEDURE — 83690 ASSAY OF LIPASE: CPT

## 2020-03-17 PROCEDURE — 99284 EMERGENCY DEPT VISIT MOD MDM: CPT

## 2020-03-17 PROCEDURE — 96361 HYDRATE IV INFUSION ADD-ON: CPT

## 2020-03-17 PROCEDURE — 85025 COMPLETE CBC W/AUTO DIFF WBC: CPT

## 2020-03-19 ENCOUNTER — HOSPITAL ENCOUNTER (OUTPATIENT)
Dept: HOSPITAL 76 - EMS | Age: 45
Discharge: ANOTHER HOSPITAL | End: 2020-03-19
Attending: SURGERY
Payer: COMMERCIAL

## 2020-03-19 ENCOUNTER — HOSPITAL ENCOUNTER (EMERGENCY)
Dept: HOSPITAL 76 - ED | Age: 45
Discharge: HOME | End: 2020-03-19
Payer: COMMERCIAL

## 2020-03-19 VITALS — DIASTOLIC BLOOD PRESSURE: 85 MMHG | SYSTOLIC BLOOD PRESSURE: 116 MMHG

## 2020-03-19 DIAGNOSIS — F17.210: ICD-10-CM

## 2020-03-19 DIAGNOSIS — R10.13: ICD-10-CM

## 2020-03-19 DIAGNOSIS — R07.9: Primary | ICD-10-CM

## 2020-03-19 DIAGNOSIS — R07.89: Primary | ICD-10-CM

## 2020-03-19 LAB
ALBUMIN DIAFP-MCNC: 4.1 G/DL (ref 3.2–5.5)
ALBUMIN/GLOB SERPL: 1.3 {RATIO} (ref 1–2.2)
ALP SERPL-CCNC: 56 IU/L (ref 42–121)
ALT SERPL W P-5'-P-CCNC: 17 IU/L (ref 10–60)
ANION GAP SERPL CALCULATED.4IONS-SCNC: 9 MMOL/L (ref 6–13)
AST SERPL W P-5'-P-CCNC: 24 IU/L (ref 10–42)
BASOPHILS NFR BLD AUTO: 0.1 10^3/UL (ref 0–0.1)
BASOPHILS NFR BLD AUTO: 1.2 %
BILIRUB BLD-MCNC: 1.2 MG/DL (ref 0.2–1)
BUN SERPL-MCNC: 7 MG/DL (ref 6–20)
CALCIUM UR-MCNC: 9 MG/DL (ref 8.5–10.3)
CHLORIDE SERPL-SCNC: 103 MMOL/L (ref 101–111)
CO2 SERPL-SCNC: 25 MMOL/L (ref 21–32)
CREAT SERPLBLD-SCNC: 0.6 MG/DL (ref 0.4–1)
EOSINOPHIL # BLD AUTO: 0.1 10^3/UL (ref 0–0.7)
EOSINOPHIL NFR BLD AUTO: 1.4 %
ERYTHROCYTE [DISTWIDTH] IN BLOOD BY AUTOMATED COUNT: 24.8 % (ref 12–15)
GFRSERPLBLD MDRD-ARVRAT: 109 ML/MIN/{1.73_M2} (ref 89–?)
GLOBULIN SER-MCNC: 3.2 G/DL (ref 2.1–4.2)
GLUCOSE SERPL-MCNC: 95 MG/DL (ref 70–100)
HGB UR QL STRIP: 9.3 G/DL (ref 12–16)
LIPASE SERPL-CCNC: 53 U/L (ref 22–51)
LYMPHOCYTES # SPEC AUTO: 1.5 10^3/UL (ref 1.5–3.5)
LYMPHOCYTES NFR BLD AUTO: 35.2 %
MCH RBC QN AUTO: 21.3 PG (ref 27–31)
MCHC RBC AUTO-ENTMCNC: 29.4 G/DL (ref 32–36)
MCV RBC AUTO: 72.3 FL (ref 81–99)
MONOCYTES # BLD AUTO: 0.3 10^3/UL (ref 0–1)
MONOCYTES NFR BLD AUTO: 7.4 %
NEUTROPHILS # BLD AUTO: 2.3 10^3/UL (ref 1.5–6.6)
NEUTROPHILS # SNV AUTO: 4.2 X10^3/UL (ref 4.8–10.8)
NEUTROPHILS NFR BLD AUTO: 54.6 %
PDW BLD AUTO: 8.4 FL (ref 7.9–10.8)
PLATELET # BLD: 431 10^3/UL (ref 130–450)
PROT SPEC-MCNC: 7.3 G/DL (ref 6.7–8.2)
RBC MAR: 4.37 10^6/UL (ref 4.2–5.4)
SODIUM SERPLBLD-SCNC: 137 MMOL/L (ref 135–145)

## 2020-03-19 PROCEDURE — 80053 COMPREHEN METABOLIC PANEL: CPT

## 2020-03-19 PROCEDURE — 84484 ASSAY OF TROPONIN QUANT: CPT

## 2020-03-19 PROCEDURE — 96361 HYDRATE IV INFUSION ADD-ON: CPT

## 2020-03-19 PROCEDURE — 36415 COLL VENOUS BLD VENIPUNCTURE: CPT

## 2020-03-19 PROCEDURE — 71045 X-RAY EXAM CHEST 1 VIEW: CPT

## 2020-03-19 PROCEDURE — 99284 EMERGENCY DEPT VISIT MOD MDM: CPT

## 2020-03-19 PROCEDURE — 93005 ELECTROCARDIOGRAM TRACING: CPT

## 2020-03-19 PROCEDURE — 85025 COMPLETE CBC W/AUTO DIFF WBC: CPT

## 2020-03-19 PROCEDURE — 83690 ASSAY OF LIPASE: CPT

## 2020-03-19 PROCEDURE — 96374 THER/PROPH/DIAG INJ IV PUSH: CPT

## 2020-04-09 ENCOUNTER — HOSPITAL ENCOUNTER (EMERGENCY)
Dept: HOSPITAL 76 - ED | Age: 45
Discharge: HOME | End: 2020-04-09
Payer: COMMERCIAL

## 2020-04-09 VITALS — DIASTOLIC BLOOD PRESSURE: 83 MMHG | SYSTOLIC BLOOD PRESSURE: 123 MMHG

## 2020-04-09 DIAGNOSIS — K86.1: Primary | ICD-10-CM

## 2020-04-09 DIAGNOSIS — G89.29: ICD-10-CM

## 2020-04-09 DIAGNOSIS — F17.200: ICD-10-CM

## 2020-04-09 DIAGNOSIS — R10.9: ICD-10-CM

## 2020-04-09 LAB
ALBUMIN DIAFP-MCNC: 4.1 G/DL (ref 3.2–5.5)
ALBUMIN/GLOB SERPL: 1.2 {RATIO} (ref 1–2.2)
ALP SERPL-CCNC: 72 IU/L (ref 42–121)
ALT SERPL W P-5'-P-CCNC: 17 IU/L (ref 10–60)
ANION GAP SERPL CALCULATED.4IONS-SCNC: 10 MMOL/L (ref 6–13)
AST SERPL W P-5'-P-CCNC: 24 IU/L (ref 10–42)
BASOPHILS NFR BLD AUTO: 0 10^3/UL (ref 0–0.1)
BASOPHILS NFR BLD AUTO: 0.5 %
BILIRUB BLD-MCNC: 0.6 MG/DL (ref 0.2–1)
BUN SERPL-MCNC: 13 MG/DL (ref 6–20)
CALCIUM UR-MCNC: 8.7 MG/DL (ref 8.5–10.3)
CHLORIDE SERPL-SCNC: 103 MMOL/L (ref 101–111)
CLARITY UR REFRACT.AUTO: CLEAR
CO2 SERPL-SCNC: 23 MMOL/L (ref 21–32)
CREAT SERPLBLD-SCNC: 0.5 MG/DL (ref 0.4–1)
EOSINOPHIL # BLD AUTO: 0.1 10^3/UL (ref 0–0.7)
EOSINOPHIL NFR BLD AUTO: 0.9 %
ERYTHROCYTE [DISTWIDTH] IN BLOOD BY AUTOMATED COUNT: 25.3 % (ref 12–15)
GLOBULIN SER-MCNC: 3.4 G/DL (ref 2.1–4.2)
GLUCOSE SERPL-MCNC: 111 MG/DL (ref 70–100)
GLUCOSE UR QL STRIP.AUTO: NEGATIVE MG/DL
HGB UR QL STRIP: 8.6 G/DL (ref 12–16)
KETONES UR QL STRIP.AUTO: NEGATIVE MG/DL
LIPASE SERPL-CCNC: 67 U/L (ref 22–51)
LYMPHOCYTES # SPEC AUTO: 1.9 10^3/UL (ref 1.5–3.5)
LYMPHOCYTES NFR BLD AUTO: 30.6 %
MCH RBC QN AUTO: 21.1 PG (ref 27–31)
MCHC RBC AUTO-ENTMCNC: 29.3 G/DL (ref 32–36)
MCV RBC AUTO: 72.2 FL (ref 81–99)
MONOCYTES # BLD AUTO: 0.4 10^3/UL (ref 0–1)
MONOCYTES NFR BLD AUTO: 6.5 %
NEUTROPHILS # BLD AUTO: 3.9 10^3/UL (ref 1.5–6.6)
NEUTROPHILS # SNV AUTO: 6.4 X10^3/UL (ref 4.8–10.8)
NEUTROPHILS NFR BLD AUTO: 61 %
NITRITE UR QL STRIP.AUTO: NEGATIVE
PDW BLD AUTO: 8.3 FL (ref 7.9–10.8)
PH UR STRIP.AUTO: 6.5 PH (ref 5–7.5)
PLAT MORPH BLD: (no result)
PLATELET # BLD: 317 10^3/UL (ref 130–450)
PLATELET BLD QL SMEAR: (no result)
PROT SPEC-MCNC: 7.5 G/DL (ref 6.7–8.2)
PROT UR STRIP.AUTO-MCNC: NEGATIVE MG/DL
RBC # UR STRIP.AUTO: NEGATIVE /UL
RBC MAR: 4.07 10^6/UL (ref 4.2–5.4)
RBC MORPH BLD: (no result)
SODIUM SERPLBLD-SCNC: 136 MMOL/L (ref 135–145)
SP GR UR STRIP.AUTO: 1.02 (ref 1–1.03)
UROBILINOGEN UR QL STRIP.AUTO: (no result) E.U./DL
UROBILINOGEN UR STRIP.AUTO-MCNC: NEGATIVE MG/DL

## 2020-04-09 PROCEDURE — 83690 ASSAY OF LIPASE: CPT

## 2020-04-09 PROCEDURE — 36415 COLL VENOUS BLD VENIPUNCTURE: CPT

## 2020-04-09 PROCEDURE — 85025 COMPLETE CBC W/AUTO DIFF WBC: CPT

## 2020-04-09 PROCEDURE — 99283 EMERGENCY DEPT VISIT LOW MDM: CPT

## 2020-04-09 PROCEDURE — 80053 COMPREHEN METABOLIC PANEL: CPT

## 2020-04-09 PROCEDURE — 99284 EMERGENCY DEPT VISIT MOD MDM: CPT

## 2020-04-09 PROCEDURE — 81003 URINALYSIS AUTO W/O SCOPE: CPT

## 2020-04-09 PROCEDURE — 87086 URINE CULTURE/COLONY COUNT: CPT

## 2020-04-09 PROCEDURE — 81001 URINALYSIS AUTO W/SCOPE: CPT

## 2020-04-27 ENCOUNTER — HOSPITAL ENCOUNTER (EMERGENCY)
Dept: HOSPITAL 76 - ED | Age: 45
Discharge: HOME | End: 2020-04-27
Payer: COMMERCIAL

## 2020-04-27 VITALS — SYSTOLIC BLOOD PRESSURE: 129 MMHG | DIASTOLIC BLOOD PRESSURE: 89 MMHG

## 2020-04-27 DIAGNOSIS — G89.29: Primary | ICD-10-CM

## 2020-04-27 DIAGNOSIS — F17.200: ICD-10-CM

## 2020-04-27 DIAGNOSIS — R10.13: ICD-10-CM

## 2020-04-27 LAB
ALBUMIN DIAFP-MCNC: 3.8 G/DL (ref 3.2–5.5)
ALBUMIN/GLOB SERPL: 1.2 {RATIO} (ref 1–2.2)
ALP SERPL-CCNC: 65 IU/L (ref 42–121)
ALT SERPL W P-5'-P-CCNC: 13 IU/L (ref 10–60)
ANION GAP SERPL CALCULATED.4IONS-SCNC: 7 MMOL/L (ref 6–13)
AST SERPL W P-5'-P-CCNC: 23 IU/L (ref 10–42)
BASOPHILS NFR BLD AUTO: 0 10^3/UL (ref 0–0.1)
BASOPHILS NFR BLD AUTO: 0.5 %
BILIRUB BLD-MCNC: 0.5 MG/DL (ref 0.2–1)
BUN SERPL-MCNC: 12 MG/DL (ref 6–20)
CALCIUM UR-MCNC: 8.4 MG/DL (ref 8.5–10.3)
CHLORIDE SERPL-SCNC: 107 MMOL/L (ref 101–111)
CO2 SERPL-SCNC: 23 MMOL/L (ref 21–32)
CREAT SERPLBLD-SCNC: 0.5 MG/DL (ref 0.4–1)
EOSINOPHIL # BLD AUTO: 0.1 10^3/UL (ref 0–0.7)
EOSINOPHIL NFR BLD AUTO: 1.2 %
ERYTHROCYTE [DISTWIDTH] IN BLOOD BY AUTOMATED COUNT: 25.1 % (ref 12–15)
GLOBULIN SER-MCNC: 3.2 G/DL (ref 2.1–4.2)
GLUCOSE SERPL-MCNC: 107 MG/DL (ref 70–100)
HGB UR QL STRIP: 8.3 G/DL (ref 12–16)
LIPASE SERPL-CCNC: 43 U/L (ref 22–51)
LYMPHOCYTES # SPEC AUTO: 2.9 10^3/UL (ref 1.5–3.5)
LYMPHOCYTES NFR BLD AUTO: 48.2 %
MANUAL DIF COMMENT BLD-IMP: (no result)
MCH RBC QN AUTO: 20.8 PG (ref 27–31)
MCHC RBC AUTO-ENTMCNC: 28.8 G/DL (ref 32–36)
MCV RBC AUTO: 72 FL (ref 81–99)
MONOCYTES # BLD AUTO: 0.6 10^3/UL (ref 0–1)
MONOCYTES NFR BLD AUTO: 9.2 %
NEUTROPHILS # BLD AUTO: 2.4 10^3/UL (ref 1.5–6.6)
NEUTROPHILS # SNV AUTO: 6 X10^3/UL (ref 4.8–10.8)
NEUTROPHILS NFR BLD AUTO: 40.6 %
PDW BLD AUTO: 7.9 FL (ref 7.9–10.8)
PLAT MORPH BLD: (no result)
PLATELET # BLD: 387 10^3/UL (ref 130–450)
PLATELET BLD QL SMEAR: (no result)
PROT SPEC-MCNC: 7 G/DL (ref 6.7–8.2)
RBC MAR: 4 10^6/UL (ref 4.2–5.4)
RBC MORPH BLD: (no result)
SODIUM SERPLBLD-SCNC: 137 MMOL/L (ref 135–145)

## 2020-04-27 PROCEDURE — 96374 THER/PROPH/DIAG INJ IV PUSH: CPT

## 2020-04-27 PROCEDURE — 85025 COMPLETE CBC W/AUTO DIFF WBC: CPT

## 2020-04-27 PROCEDURE — 99284 EMERGENCY DEPT VISIT MOD MDM: CPT

## 2020-04-27 PROCEDURE — 83690 ASSAY OF LIPASE: CPT

## 2020-04-27 PROCEDURE — 81003 URINALYSIS AUTO W/O SCOPE: CPT

## 2020-04-27 PROCEDURE — 81001 URINALYSIS AUTO W/SCOPE: CPT

## 2020-04-27 PROCEDURE — 80306 DRUG TEST PRSMV INSTRMNT: CPT

## 2020-04-27 PROCEDURE — 96361 HYDRATE IV INFUSION ADD-ON: CPT

## 2020-04-27 PROCEDURE — 87086 URINE CULTURE/COLONY COUNT: CPT

## 2020-04-27 PROCEDURE — 36415 COLL VENOUS BLD VENIPUNCTURE: CPT

## 2020-04-27 PROCEDURE — 80053 COMPREHEN METABOLIC PANEL: CPT

## 2020-04-27 RX ADMIN — SODIUM CHLORIDE ONE MLS/HR: 9 INJECTION, SOLUTION INTRAVENOUS at 20:51

## 2020-04-27 RX ADMIN — ONDANSETRON STA MG: 2 INJECTION INTRAMUSCULAR; INTRAVENOUS at 20:51

## 2020-04-27 RX ADMIN — DEXTROSE MONOHYDRATE STA MG: 50 INJECTION, SOLUTION INTRAVENOUS at 20:51

## 2020-04-27 RX ADMIN — OXYCODONE STA MG: 5 TABLET ORAL at 21:23

## 2020-05-12 ENCOUNTER — HOSPITAL ENCOUNTER (EMERGENCY)
Dept: HOSPITAL 76 - ED | Age: 45
Discharge: HOME | End: 2020-05-12
Payer: COMMERCIAL

## 2020-05-12 VITALS — DIASTOLIC BLOOD PRESSURE: 81 MMHG | SYSTOLIC BLOOD PRESSURE: 128 MMHG

## 2020-05-12 DIAGNOSIS — S93.601A: Primary | ICD-10-CM
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
## 2020-05-12 DIAGNOSIS — F17.200: ICD-10-CM

## 2020-05-12 PROCEDURE — 99282 EMERGENCY DEPT VISIT SF MDM: CPT

## 2020-05-12 PROCEDURE — 99283 EMERGENCY DEPT VISIT LOW MDM: CPT

## 2020-06-08 ENCOUNTER — HOSPITAL ENCOUNTER (EMERGENCY)
Dept: HOSPITAL 76 - ED | Age: 45
Discharge: LEFT BEFORE DISCHARGE | End: 2020-06-08
Payer: COMMERCIAL

## 2020-06-08 VITALS — SYSTOLIC BLOOD PRESSURE: 129 MMHG | DIASTOLIC BLOOD PRESSURE: 84 MMHG

## 2020-06-08 DIAGNOSIS — Z53.20: ICD-10-CM

## 2020-06-08 DIAGNOSIS — F17.200: ICD-10-CM

## 2020-06-08 DIAGNOSIS — R11.2: Primary | ICD-10-CM

## 2020-06-08 LAB
CLARITY UR REFRACT.AUTO: CLEAR
GLUCOSE UR QL STRIP.AUTO: NEGATIVE MG/DL
HCG UR QL: NEGATIVE
KETONES UR QL STRIP.AUTO: (no result) MG/DL
NITRITE UR QL STRIP.AUTO: NEGATIVE
PH UR STRIP.AUTO: 6.5 PH (ref 5–7.5)
PROT UR STRIP.AUTO-MCNC: NEGATIVE MG/DL
RBC # UR STRIP.AUTO: NEGATIVE /UL
SP GR UR STRIP.AUTO: 1.02 (ref 1–1.03)
SP GR UR STRIP.AUTO: 1.02 (ref 1–1.03)
SQUAMOUS URNS QL MICRO: (no result)
UROBILINOGEN UR QL STRIP.AUTO: (no result) E.U./DL
UROBILINOGEN UR STRIP.AUTO-MCNC: NEGATIVE MG/DL

## 2020-06-08 PROCEDURE — 81001 URINALYSIS AUTO W/SCOPE: CPT

## 2020-06-08 PROCEDURE — 81003 URINALYSIS AUTO W/O SCOPE: CPT

## 2020-06-08 PROCEDURE — 99283 EMERGENCY DEPT VISIT LOW MDM: CPT

## 2020-06-08 PROCEDURE — 81025 URINE PREGNANCY TEST: CPT

## 2020-06-08 PROCEDURE — 87086 URINE CULTURE/COLONY COUNT: CPT

## 2020-09-24 ENCOUNTER — HOSPITAL ENCOUNTER (OUTPATIENT)
Dept: HOSPITAL 76 - EMS | Age: 45
Discharge: ANOTHER HOSPITAL | End: 2020-09-24
Attending: SURGERY
Payer: COMMERCIAL

## 2020-09-24 DIAGNOSIS — R10.11: Primary | ICD-10-CM

## 2020-09-29 ENCOUNTER — HOSPITAL ENCOUNTER (EMERGENCY)
Dept: HOSPITAL 76 - ED | Age: 45
Discharge: HOME | End: 2020-09-29
Payer: COMMERCIAL

## 2020-09-29 VITALS — DIASTOLIC BLOOD PRESSURE: 78 MMHG | SYSTOLIC BLOOD PRESSURE: 105 MMHG

## 2020-09-29 DIAGNOSIS — F17.200: ICD-10-CM

## 2020-09-29 DIAGNOSIS — G43.909: Primary | ICD-10-CM

## 2020-09-29 PROCEDURE — 99284 EMERGENCY DEPT VISIT MOD MDM: CPT

## 2020-09-29 PROCEDURE — 99283 EMERGENCY DEPT VISIT LOW MDM: CPT

## 2020-09-29 PROCEDURE — 96374 THER/PROPH/DIAG INJ IV PUSH: CPT

## 2020-09-29 PROCEDURE — 96375 TX/PRO/DX INJ NEW DRUG ADDON: CPT

## 2020-10-24 ENCOUNTER — HOSPITAL ENCOUNTER (EMERGENCY)
Dept: HOSPITAL 76 - ED | Age: 45
Discharge: HOME | End: 2020-10-24
Payer: COMMERCIAL

## 2020-10-24 VITALS — SYSTOLIC BLOOD PRESSURE: 124 MMHG | DIASTOLIC BLOOD PRESSURE: 81 MMHG

## 2020-10-24 DIAGNOSIS — M54.30: Primary | ICD-10-CM

## 2020-10-24 DIAGNOSIS — F17.200: ICD-10-CM

## 2020-10-24 PROCEDURE — 99284 EMERGENCY DEPT VISIT MOD MDM: CPT

## 2020-10-24 PROCEDURE — 99282 EMERGENCY DEPT VISIT SF MDM: CPT

## 2021-02-11 ENCOUNTER — HOSPITAL ENCOUNTER (EMERGENCY)
Dept: HOSPITAL 76 - ED | Age: 46
Discharge: HOME | End: 2021-02-11
Payer: COMMERCIAL

## 2021-02-11 VITALS — SYSTOLIC BLOOD PRESSURE: 122 MMHG | DIASTOLIC BLOOD PRESSURE: 74 MMHG

## 2021-02-11 DIAGNOSIS — F17.200: ICD-10-CM

## 2021-02-11 DIAGNOSIS — R10.32: Primary | ICD-10-CM

## 2021-02-11 LAB
ALBUMIN DIAFP-MCNC: 3.7 G/DL (ref 3.2–5.5)
ALBUMIN/GLOB SERPL: 1.2 {RATIO} (ref 1–2.2)
ALP SERPL-CCNC: 84 IU/L (ref 42–121)
ALT SERPL W P-5'-P-CCNC: 20 IU/L (ref 10–60)
ANION GAP SERPL CALCULATED.4IONS-SCNC: 8 MMOL/L (ref 6–13)
AST SERPL W P-5'-P-CCNC: 37 IU/L (ref 10–42)
BASOPHILS NFR BLD AUTO: 0 10^3/UL (ref 0–0.1)
BASOPHILS NFR BLD AUTO: 0.5 %
BILIRUB BLD-MCNC: 0.6 MG/DL (ref 0.2–1)
BUN SERPL-MCNC: 14 MG/DL (ref 6–20)
CALCIUM UR-MCNC: 8.6 MG/DL (ref 8.5–10.3)
CHLORIDE SERPL-SCNC: 103 MMOL/L (ref 101–111)
CLARITY UR REFRACT.AUTO: CLEAR
CO2 SERPL-SCNC: 24 MMOL/L (ref 21–32)
CREAT SERPLBLD-SCNC: 0.5 MG/DL (ref 0.4–1)
EOSINOPHIL # BLD AUTO: 0.1 10^3/UL (ref 0–0.7)
EOSINOPHIL NFR BLD AUTO: 1.2 %
ERYTHROCYTE [DISTWIDTH] IN BLOOD BY AUTOMATED COUNT: 24.8 % (ref 12–15)
GLOBULIN SER-MCNC: 3.2 G/DL (ref 2.1–4.2)
GLUCOSE SERPL-MCNC: 111 MG/DL (ref 70–100)
GLUCOSE UR QL STRIP.AUTO: NEGATIVE MG/DL
HGB UR QL STRIP: 8.9 G/DL (ref 12–16)
KETONES UR QL STRIP.AUTO: 15 MG/DL
LIPASE SERPL-CCNC: 108 U/L (ref 22–51)
LYMPHOCYTES # SPEC AUTO: 2.6 10^3/UL (ref 1.5–3.5)
LYMPHOCYTES NFR BLD AUTO: 39.4 %
MCH RBC QN AUTO: 20.6 PG (ref 27–31)
MCHC RBC AUTO-ENTMCNC: 28.3 G/DL (ref 32–36)
MCV RBC AUTO: 72.7 FL (ref 81–99)
MONOCYTES # BLD AUTO: 0.6 10^3/UL (ref 0–1)
MONOCYTES NFR BLD AUTO: 8.5 %
NEUTROPHILS # BLD AUTO: 3.3 10^3/UL (ref 1.5–6.6)
NEUTROPHILS # SNV AUTO: 6.6 X10^3/UL (ref 4.8–10.8)
NEUTROPHILS NFR BLD AUTO: 50.1 %
NITRITE UR QL STRIP.AUTO: NEGATIVE
PDW BLD AUTO: 8.9 FL (ref 7.9–10.8)
PH UR STRIP.AUTO: 6 PH (ref 5–7.5)
PLAT MORPH BLD: (no result)
PLATELET # BLD: 205 10^3/UL (ref 130–450)
PLATELET BLD QL SMEAR: (no result)
PROT SPEC-MCNC: 6.9 G/DL (ref 6.7–8.2)
PROT UR STRIP.AUTO-MCNC: NEGATIVE MG/DL
RBC # UR STRIP.AUTO: NEGATIVE /UL
RBC MAR: 4.32 10^6/UL (ref 4.2–5.4)
RBC MORPH BLD: (no result)
SP GR UR STRIP.AUTO: >=1.03 (ref 1–1.03)
UROBILINOGEN UR QL STRIP.AUTO: (no result) E.U./DL
UROBILINOGEN UR STRIP.AUTO-MCNC: NEGATIVE MG/DL

## 2021-02-11 PROCEDURE — 96374 THER/PROPH/DIAG INJ IV PUSH: CPT

## 2021-02-11 PROCEDURE — 96375 TX/PRO/DX INJ NEW DRUG ADDON: CPT

## 2021-02-11 PROCEDURE — 81001 URINALYSIS AUTO W/SCOPE: CPT

## 2021-02-11 PROCEDURE — 83605 ASSAY OF LACTIC ACID: CPT

## 2021-02-11 PROCEDURE — 96361 HYDRATE IV INFUSION ADD-ON: CPT

## 2021-02-11 PROCEDURE — 80053 COMPREHEN METABOLIC PANEL: CPT

## 2021-02-11 PROCEDURE — 83690 ASSAY OF LIPASE: CPT

## 2021-02-11 PROCEDURE — 99284 EMERGENCY DEPT VISIT MOD MDM: CPT

## 2021-02-11 PROCEDURE — 36415 COLL VENOUS BLD VENIPUNCTURE: CPT

## 2021-02-11 PROCEDURE — 74177 CT ABD & PELVIS W/CONTRAST: CPT

## 2021-02-11 PROCEDURE — 85025 COMPLETE CBC W/AUTO DIFF WBC: CPT

## 2021-02-11 PROCEDURE — 81003 URINALYSIS AUTO W/O SCOPE: CPT

## 2021-02-11 PROCEDURE — 87086 URINE CULTURE/COLONY COUNT: CPT

## 2021-03-17 ENCOUNTER — HOSPITAL ENCOUNTER (EMERGENCY)
Dept: HOSPITAL 76 - ED | Age: 46
Discharge: LEFT BEFORE DISCHARGE | End: 2021-03-17
Payer: COMMERCIAL

## 2021-03-17 VITALS — DIASTOLIC BLOOD PRESSURE: 78 MMHG | SYSTOLIC BLOOD PRESSURE: 126 MMHG

## 2021-03-17 DIAGNOSIS — Z53.21: Primary | ICD-10-CM

## 2021-03-17 PROCEDURE — 85025 COMPLETE CBC W/AUTO DIFF WBC: CPT

## 2021-03-17 PROCEDURE — 80053 COMPREHEN METABOLIC PANEL: CPT

## 2021-03-17 PROCEDURE — 83690 ASSAY OF LIPASE: CPT

## 2021-03-29 ENCOUNTER — HOSPITAL ENCOUNTER (EMERGENCY)
Dept: HOSPITAL 76 - ED | Age: 46
Discharge: HOME | End: 2021-03-29
Payer: COMMERCIAL

## 2021-03-29 VITALS — DIASTOLIC BLOOD PRESSURE: 96 MMHG | SYSTOLIC BLOOD PRESSURE: 129 MMHG

## 2021-03-29 DIAGNOSIS — Z98.84: ICD-10-CM

## 2021-03-29 DIAGNOSIS — F17.200: ICD-10-CM

## 2021-03-29 DIAGNOSIS — Z90.49: ICD-10-CM

## 2021-03-29 DIAGNOSIS — K86.1: Primary | ICD-10-CM

## 2021-03-29 DIAGNOSIS — Z87.19: ICD-10-CM

## 2021-03-29 LAB
ALBUMIN DIAFP-MCNC: 4.4 G/DL (ref 3.2–5.5)
ALBUMIN/GLOB SERPL: 1.4 {RATIO} (ref 1–2.2)
ALP SERPL-CCNC: 73 IU/L (ref 42–121)
ALT SERPL W P-5'-P-CCNC: 21 IU/L (ref 10–60)
ANION GAP SERPL CALCULATED.4IONS-SCNC: 11 MMOL/L (ref 6–13)
AST SERPL W P-5'-P-CCNC: 24 IU/L (ref 10–42)
BASOPHILS NFR BLD AUTO: 0 10^3/UL (ref 0–0.1)
BASOPHILS NFR BLD AUTO: 0.4 %
BILIRUB BLD-MCNC: 0.8 MG/DL (ref 0.2–1)
BUN SERPL-MCNC: 10 MG/DL (ref 6–20)
CALCIUM UR-MCNC: 9.3 MG/DL (ref 8.5–10.3)
CHLORIDE SERPL-SCNC: 101 MMOL/L (ref 101–111)
CLARITY UR REFRACT.AUTO: CLEAR
CO2 SERPL-SCNC: 25 MMOL/L (ref 21–32)
CREAT SERPLBLD-SCNC: 0.6 MG/DL (ref 0.4–1)
EOSINOPHIL # BLD AUTO: 0.1 10^3/UL (ref 0–0.7)
EOSINOPHIL NFR BLD AUTO: 0.5 %
ERYTHROCYTE [DISTWIDTH] IN BLOOD BY AUTOMATED COUNT: 29.5 % (ref 12–15)
GFRSERPLBLD MDRD-ARVRAT: 109 ML/MIN/{1.73_M2} (ref 89–?)
GLOBULIN SER-MCNC: 3.2 G/DL (ref 2.1–4.2)
GLUCOSE SERPL-MCNC: 100 MG/DL (ref 70–100)
GLUCOSE UR QL STRIP.AUTO: NEGATIVE MG/DL
HCG UR QL: NEGATIVE
HCT VFR BLD AUTO: 38.6 % (ref 37–47)
HGB UR QL STRIP: 11.5 G/DL (ref 12–16)
KETONES UR QL STRIP.AUTO: NEGATIVE MG/DL
LIPASE SERPL-CCNC: 58 U/L (ref 22–51)
LYMPHOCYTES # SPEC AUTO: 2.6 10^3/UL (ref 1.5–3.5)
LYMPHOCYTES NFR BLD AUTO: 25.3 %
MCH RBC QN AUTO: 22.7 PG (ref 27–31)
MCHC RBC AUTO-ENTMCNC: 29.8 G/DL (ref 32–36)
MCV RBC AUTO: 76.3 FL (ref 81–99)
MONOCYTES # BLD AUTO: 0.3 10^3/UL (ref 0–1)
MONOCYTES NFR BLD AUTO: 3.4 %
NEUTROPHILS # BLD AUTO: 7.1 10^3/UL (ref 1.5–6.6)
NEUTROPHILS # SNV AUTO: 10.1 X10^3/UL (ref 4.8–10.8)
NEUTROPHILS NFR BLD AUTO: 69.9 %
NITRITE UR QL STRIP.AUTO: NEGATIVE
NRBC # BLD AUTO: 0 /100WBC
NRBC # BLD AUTO: 0 X10^3/UL
PDW BLD AUTO: 8.2 FL (ref 7.9–10.8)
PH UR STRIP.AUTO: 7 PH (ref 5–7.5)
PLAT MORPH BLD: (no result)
PLATELET # BLD: 313 10^3/UL (ref 130–450)
PLATELET BLD QL SMEAR: (no result)
POTASSIUM SERPL-SCNC: 4 MMOL/L (ref 3.5–5)
PROT SPEC-MCNC: 7.6 G/DL (ref 6.7–8.2)
PROT UR STRIP.AUTO-MCNC: NEGATIVE MG/DL
RBC # UR STRIP.AUTO: NEGATIVE /UL
RBC MAR: 5.06 10^6/UL (ref 4.2–5.4)
RBC MORPH BLD: (no result)
SODIUM SERPLBLD-SCNC: 137 MMOL/L (ref 135–145)
SP GR UR STRIP.AUTO: 1.01 (ref 1–1.03)
UROBILINOGEN UR QL STRIP.AUTO: (no result) E.U./DL
UROBILINOGEN UR STRIP.AUTO-MCNC: NEGATIVE MG/DL
WBC MORPH BLD: (no result)

## 2021-03-29 PROCEDURE — 83690 ASSAY OF LIPASE: CPT

## 2021-03-29 PROCEDURE — 36415 COLL VENOUS BLD VENIPUNCTURE: CPT

## 2021-03-29 PROCEDURE — 85025 COMPLETE CBC W/AUTO DIFF WBC: CPT

## 2021-03-29 PROCEDURE — 96374 THER/PROPH/DIAG INJ IV PUSH: CPT

## 2021-03-29 PROCEDURE — 81001 URINALYSIS AUTO W/SCOPE: CPT

## 2021-03-29 PROCEDURE — 80053 COMPREHEN METABOLIC PANEL: CPT

## 2021-03-29 PROCEDURE — 81025 URINE PREGNANCY TEST: CPT

## 2021-03-29 PROCEDURE — 81003 URINALYSIS AUTO W/O SCOPE: CPT

## 2021-03-29 PROCEDURE — 87086 URINE CULTURE/COLONY COUNT: CPT

## 2021-03-29 PROCEDURE — 99281 EMR DPT VST MAYX REQ PHY/QHP: CPT

## 2021-05-24 ENCOUNTER — HOSPITAL ENCOUNTER (EMERGENCY)
Dept: HOSPITAL 76 - ED | Age: 46
Discharge: HOME | End: 2021-05-24
Payer: COMMERCIAL

## 2021-05-24 VITALS — DIASTOLIC BLOOD PRESSURE: 78 MMHG | SYSTOLIC BLOOD PRESSURE: 121 MMHG

## 2021-05-24 DIAGNOSIS — R11.2: ICD-10-CM

## 2021-05-24 DIAGNOSIS — R10.11: Primary | ICD-10-CM

## 2021-05-24 DIAGNOSIS — F17.200: ICD-10-CM

## 2021-05-24 LAB
ALBUMIN DIAFP-MCNC: 4.1 G/DL (ref 3.2–5.5)
ALBUMIN/GLOB SERPL: 1.3 {RATIO} (ref 1–2.2)
ALP SERPL-CCNC: 75 IU/L (ref 42–121)
ALT SERPL W P-5'-P-CCNC: 17 IU/L (ref 10–60)
AMPHET UR QL SCN: NEGATIVE
ANION GAP SERPL CALCULATED.4IONS-SCNC: 10 MMOL/L (ref 6–13)
AST SERPL W P-5'-P-CCNC: 22 IU/L (ref 10–42)
BARBITURATES UR QL SCN>300 NG/ML: NEGATIVE
BASOPHILS NFR BLD AUTO: 0 10^3/UL (ref 0–0.1)
BASOPHILS NFR BLD AUTO: 0.5 %
BENZODIAZ UR QL SCN: NEGATIVE
BILIRUB BLD-MCNC: 0.8 MG/DL (ref 0.2–1)
BUN SERPL-MCNC: 10 MG/DL (ref 6–20)
CALCIUM UR-MCNC: 8.7 MG/DL (ref 8.5–10.3)
CHLORIDE SERPL-SCNC: 102 MMOL/L (ref 101–111)
CLARITY UR REFRACT.AUTO: CLEAR
CO2 SERPL-SCNC: 25 MMOL/L (ref 21–32)
COCAINE UR-SCNC: NEGATIVE UMOL/L
CREAT SERPLBLD-SCNC: 0.6 MG/DL (ref 0.4–1)
EOSINOPHIL # BLD AUTO: 0.1 10^3/UL (ref 0–0.7)
EOSINOPHIL NFR BLD AUTO: 0.8 %
ERYTHROCYTE [DISTWIDTH] IN BLOOD BY AUTOMATED COUNT: 24.2 % (ref 12–15)
GFRSERPLBLD MDRD-ARVRAT: 109 ML/MIN/{1.73_M2} (ref 89–?)
GLOBULIN SER-MCNC: 3.1 G/DL (ref 2.1–4.2)
GLUCOSE SERPL-MCNC: 146 MG/DL (ref 70–100)
GLUCOSE UR QL STRIP.AUTO: NEGATIVE MG/DL
HCG UR QL: NEGATIVE
HCT VFR BLD AUTO: 33.1 % (ref 37–47)
HGB UR QL STRIP: 10.4 G/DL (ref 12–16)
KETONES UR QL STRIP.AUTO: NEGATIVE MG/DL
LIPASE SERPL-CCNC: 52 U/L (ref 22–51)
LYMPHOCYTES # SPEC AUTO: 1.8 10^3/UL (ref 1.5–3.5)
LYMPHOCYTES NFR BLD AUTO: 28.4 %
MCH RBC QN AUTO: 25.1 PG (ref 27–31)
MCHC RBC AUTO-ENTMCNC: 31.4 G/DL (ref 32–36)
MCV RBC AUTO: 80 FL (ref 81–99)
METHADONE UR QL SCN: NEGATIVE
METHAMPHET UR QL SCN: NEGATIVE
MONOCYTES # BLD AUTO: 0.3 10^3/UL (ref 0–1)
MONOCYTES NFR BLD AUTO: 5 %
NEUTROPHILS # BLD AUTO: 4 10^3/UL (ref 1.5–6.6)
NEUTROPHILS # SNV AUTO: 6.2 X10^3/UL (ref 4.8–10.8)
NEUTROPHILS NFR BLD AUTO: 65 %
NITRITE UR QL STRIP.AUTO: NEGATIVE
NRBC # BLD AUTO: 0 /100WBC
NRBC # BLD AUTO: 0 X10^3/UL
OPIATES UR QL SCN: NEGATIVE
PDW BLD AUTO: 8.2 FL (ref 7.9–10.8)
PH UR STRIP.AUTO: 6.5 PH (ref 5–7.5)
PLAT MORPH BLD: (no result)
PLATELET # BLD: 296 10^3/UL (ref 130–450)
PLATELET BLD QL SMEAR: (no result)
POTASSIUM SERPL-SCNC: 3.4 MMOL/L (ref 3.5–5)
PROT SPEC-MCNC: 7.2 G/DL (ref 6.7–8.2)
PROT UR STRIP.AUTO-MCNC: NEGATIVE MG/DL
RBC # UR STRIP.AUTO: (no result) /UL
RBC MAR: 4.14 10^6/UL (ref 4.2–5.4)
RBC MORPH BLD: (no result)
SODIUM SERPLBLD-SCNC: 137 MMOL/L (ref 135–145)
SP GR UR STRIP.AUTO: 1.02 (ref 1–1.03)
THC UR QL SCN: POSITIVE
UROBILINOGEN UR QL STRIP.AUTO: (no result) E.U./DL
UROBILINOGEN UR STRIP.AUTO-MCNC: NEGATIVE MG/DL
VOLATILE DRUGS POS SERPL SCN: (no result)

## 2021-05-24 PROCEDURE — 81025 URINE PREGNANCY TEST: CPT

## 2021-05-24 PROCEDURE — 36415 COLL VENOUS BLD VENIPUNCTURE: CPT

## 2021-05-24 PROCEDURE — 87086 URINE CULTURE/COLONY COUNT: CPT

## 2021-05-24 PROCEDURE — 85025 COMPLETE CBC W/AUTO DIFF WBC: CPT

## 2021-05-24 PROCEDURE — 81001 URINALYSIS AUTO W/SCOPE: CPT

## 2021-05-24 PROCEDURE — 80053 COMPREHEN METABOLIC PANEL: CPT

## 2021-05-24 PROCEDURE — 99283 EMERGENCY DEPT VISIT LOW MDM: CPT

## 2021-05-24 PROCEDURE — 83690 ASSAY OF LIPASE: CPT

## 2021-05-24 PROCEDURE — 81003 URINALYSIS AUTO W/O SCOPE: CPT

## 2021-05-24 PROCEDURE — 80306 DRUG TEST PRSMV INSTRMNT: CPT

## 2021-05-24 PROCEDURE — 96372 THER/PROPH/DIAG INJ SC/IM: CPT

## 2022-03-27 ENCOUNTER — HOSPITAL ENCOUNTER (EMERGENCY)
Dept: HOSPITAL 76 - ED | Age: 47
Discharge: HOME | End: 2022-03-27
Payer: COMMERCIAL

## 2022-03-27 VITALS — DIASTOLIC BLOOD PRESSURE: 82 MMHG | SYSTOLIC BLOOD PRESSURE: 124 MMHG

## 2022-03-27 DIAGNOSIS — F17.200: ICD-10-CM

## 2022-03-27 DIAGNOSIS — R10.11: Primary | ICD-10-CM

## 2022-03-27 LAB
ALBUMIN DIAFP-MCNC: 4.1 G/DL (ref 3.2–5.5)
ALBUMIN/GLOB SERPL: 1.2 {RATIO} (ref 1–2.2)
ALP SERPL-CCNC: 87 IU/L (ref 42–121)
ALT SERPL W P-5'-P-CCNC: 14 IU/L (ref 10–60)
AMPHET UR QL SCN: NEGATIVE
ANION GAP SERPL CALCULATED.4IONS-SCNC: 11 MMOL/L (ref 6–13)
AST SERPL W P-5'-P-CCNC: 24 IU/L (ref 10–42)
BARBITURATES UR QL SCN>300 NG/ML: NEGATIVE
BASOPHILS NFR BLD AUTO: 0 10^3/UL (ref 0–0.1)
BASOPHILS NFR BLD AUTO: 0.3 %
BENZODIAZ UR QL SCN: NEGATIVE
BILIRUB BLD-MCNC: 0.6 MG/DL (ref 0.2–1)
BUN SERPL-MCNC: 12 MG/DL (ref 6–20)
CALCIUM UR-MCNC: 8.7 MG/DL (ref 8.5–10.3)
CHLORIDE SERPL-SCNC: 101 MMOL/L (ref 101–111)
CLARITY UR REFRACT.AUTO: CLEAR
CO2 SERPL-SCNC: 25 MMOL/L (ref 21–32)
COCAINE UR-SCNC: NEGATIVE UMOL/L
CREAT SERPLBLD-SCNC: 0.7 MG/DL (ref 0.4–1)
EOSINOPHIL # BLD AUTO: 0 10^3/UL (ref 0–0.7)
EOSINOPHIL NFR BLD AUTO: 0 %
ERYTHROCYTE [DISTWIDTH] IN BLOOD BY AUTOMATED COUNT: 22.5 % (ref 12–15)
GFRSERPLBLD MDRD-ARVRAT: 90 ML/MIN/{1.73_M2} (ref 89–?)
GLOBULIN SER-MCNC: 3.4 G/DL (ref 2.1–4.2)
GLUCOSE SERPL-MCNC: 117 MG/DL (ref 70–100)
GLUCOSE UR QL STRIP.AUTO: NEGATIVE MG/DL
HCG UR QL: NEGATIVE
HCT VFR BLD AUTO: 35.2 % (ref 37–47)
HGB UR QL STRIP: 10.5 G/DL (ref 12–16)
KETONES UR QL STRIP.AUTO: NEGATIVE MG/DL
LIPASE SERPL-CCNC: 56 U/L (ref 22–51)
LYMPHOCYTES # SPEC AUTO: 3.1 10^3/UL (ref 1.5–3.5)
LYMPHOCYTES NFR BLD AUTO: 42.5 %
MCH RBC QN AUTO: 21 PG (ref 27–31)
MCHC RBC AUTO-ENTMCNC: 29.8 G/DL (ref 32–36)
MCV RBC AUTO: 70.4 FL (ref 81–99)
METHADONE UR QL SCN: NEGATIVE
METHAMPHET UR QL SCN: NEGATIVE
MONOCYTES # BLD AUTO: 0.4 10^3/UL (ref 0–1)
MONOCYTES NFR BLD AUTO: 5.7 %
NEUTROPHILS # BLD AUTO: 3.7 10^3/UL (ref 1.5–6.6)
NEUTROPHILS # SNV AUTO: 7.2 X10^3/UL (ref 4.8–10.8)
NEUTROPHILS NFR BLD AUTO: 51.2 %
NITRITE UR QL STRIP.AUTO: NEGATIVE
NRBC # BLD AUTO: 0 /100WBC
NRBC # BLD AUTO: 0 X10^3/UL
OPIATES UR QL SCN: POSITIVE
PDW BLD AUTO: 8.2 FL (ref 7.9–10.8)
PH UR STRIP.AUTO: 5.5 PH (ref 5–7.5)
PLAT MORPH BLD: (no result)
PLATELET # BLD: 331 10^3/UL (ref 130–450)
PLATELET BLD QL SMEAR: (no result)
POTASSIUM SERPL-SCNC: 4 MMOL/L (ref 3.5–5)
PROT SPEC-MCNC: 7.5 G/DL (ref 6.7–8.2)
PROT UR STRIP.AUTO-MCNC: NEGATIVE MG/DL
RBC # UR STRIP.AUTO: NEGATIVE /UL
RBC MAR: 5 10^6/UL (ref 4.2–5.4)
RBC MORPH BLD: (no result)
SODIUM SERPLBLD-SCNC: 137 MMOL/L (ref 135–145)
SP GR UR STRIP.AUTO: 1.01 (ref 1–1.03)
THC UR QL SCN: POSITIVE
UROBILINOGEN UR QL STRIP.AUTO: (no result) E.U./DL
UROBILINOGEN UR STRIP.AUTO-MCNC: NEGATIVE MG/DL
VOLATILE DRUGS POS SERPL SCN: (no result)

## 2022-03-27 PROCEDURE — 81025 URINE PREGNANCY TEST: CPT

## 2022-03-27 PROCEDURE — 96376 TX/PRO/DX INJ SAME DRUG ADON: CPT

## 2022-03-27 PROCEDURE — 96375 TX/PRO/DX INJ NEW DRUG ADDON: CPT

## 2022-03-27 PROCEDURE — 83690 ASSAY OF LIPASE: CPT

## 2022-03-27 PROCEDURE — 81001 URINALYSIS AUTO W/SCOPE: CPT

## 2022-03-27 PROCEDURE — 74177 CT ABD & PELVIS W/CONTRAST: CPT

## 2022-03-27 PROCEDURE — 81003 URINALYSIS AUTO W/O SCOPE: CPT

## 2022-03-27 PROCEDURE — 99284 EMERGENCY DEPT VISIT MOD MDM: CPT

## 2022-03-27 PROCEDURE — 87086 URINE CULTURE/COLONY COUNT: CPT

## 2022-03-27 PROCEDURE — 96374 THER/PROPH/DIAG INJ IV PUSH: CPT

## 2022-03-27 PROCEDURE — 36415 COLL VENOUS BLD VENIPUNCTURE: CPT

## 2022-03-27 PROCEDURE — 80306 DRUG TEST PRSMV INSTRMNT: CPT

## 2022-03-27 PROCEDURE — 99282 EMERGENCY DEPT VISIT SF MDM: CPT

## 2022-03-27 PROCEDURE — 80053 COMPREHEN METABOLIC PANEL: CPT

## 2022-03-27 PROCEDURE — 85025 COMPLETE CBC W/AUTO DIFF WBC: CPT

## 2022-03-27 RX ADMIN — HYDROMORPHONE HYDROCHLORIDE STA MG: 1 INJECTION, SOLUTION INTRAMUSCULAR; INTRAVENOUS; SUBCUTANEOUS at 16:47

## 2022-03-27 RX ADMIN — HYDROMORPHONE HYDROCHLORIDE STA MG: 1 INJECTION, SOLUTION INTRAMUSCULAR; INTRAVENOUS; SUBCUTANEOUS at 15:30

## 2022-03-27 RX ADMIN — IOVERSOL ONE ML: 678 INJECTION INTRA-ARTERIAL; INTRAVENOUS at 17:31

## 2022-03-27 RX ADMIN — ONDANSETRON STA MG: 2 INJECTION INTRAMUSCULAR; INTRAVENOUS at 15:28

## 2022-07-31 ENCOUNTER — HOSPITAL ENCOUNTER (EMERGENCY)
Dept: HOSPITAL 76 - ED | Age: 47
Discharge: LEFT BEFORE DISCHARGE | End: 2022-07-31
Payer: COMMERCIAL

## 2022-07-31 VITALS — DIASTOLIC BLOOD PRESSURE: 88 MMHG | SYSTOLIC BLOOD PRESSURE: 133 MMHG

## 2022-07-31 DIAGNOSIS — R05.9: ICD-10-CM

## 2022-07-31 DIAGNOSIS — R07.9: Primary | ICD-10-CM

## 2022-07-31 DIAGNOSIS — F17.200: ICD-10-CM

## 2022-07-31 PROCEDURE — 99282 EMERGENCY DEPT VISIT SF MDM: CPT

## 2022-07-31 PROCEDURE — 99281 EMR DPT VST MAYX REQ PHY/QHP: CPT

## 2023-05-01 ENCOUNTER — HOSPITAL ENCOUNTER (OUTPATIENT)
Dept: HOSPITAL 76 - DI | Age: 48
Discharge: HOME | End: 2023-05-01
Attending: FAMILY MEDICINE
Payer: COMMERCIAL

## 2023-05-01 DIAGNOSIS — R59.0: Primary | ICD-10-CM

## 2023-05-01 PROCEDURE — 70491 CT SOFT TISSUE NECK W/DYE: CPT

## 2024-02-03 ENCOUNTER — HOSPITAL ENCOUNTER (OUTPATIENT)
Dept: HOSPITAL 76 - EMS | Age: 49
Discharge: LEFT BEFORE DISCHARGE | End: 2024-02-03
Payer: COMMERCIAL

## 2024-02-03 DIAGNOSIS — Z04.1: Primary | ICD-10-CM
